# Patient Record
Sex: FEMALE | Race: WHITE | NOT HISPANIC OR LATINO | Employment: FULL TIME | URBAN - METROPOLITAN AREA
[De-identification: names, ages, dates, MRNs, and addresses within clinical notes are randomized per-mention and may not be internally consistent; named-entity substitution may affect disease eponyms.]

---

## 2018-04-25 ENCOUNTER — TRANSFERRED RECORDS (OUTPATIENT)
Dept: HEALTH INFORMATION MANAGEMENT | Facility: CLINIC | Age: 28
End: 2018-04-25

## 2018-04-25 LAB — PAP-ABSTRACT: NORMAL

## 2020-06-18 ENCOUNTER — OFFICE VISIT (OUTPATIENT)
Dept: FAMILY MEDICINE | Facility: OTHER | Age: 30
End: 2020-06-18
Attending: FAMILY MEDICINE
Payer: COMMERCIAL

## 2020-06-18 VITALS
HEART RATE: 82 BPM | DIASTOLIC BLOOD PRESSURE: 66 MMHG | TEMPERATURE: 98.1 F | OXYGEN SATURATION: 96 % | RESPIRATION RATE: 16 BRPM | SYSTOLIC BLOOD PRESSURE: 102 MMHG | WEIGHT: 152.6 LBS | HEIGHT: 66 IN | BODY MASS INDEX: 24.53 KG/M2

## 2020-06-18 DIAGNOSIS — E55.9 VITAMIN D DEFICIENCY: Primary | ICD-10-CM

## 2020-06-18 DIAGNOSIS — F41.9 ANXIETY: ICD-10-CM

## 2020-06-18 DIAGNOSIS — N83.202 LEFT OVARIAN CYST: ICD-10-CM

## 2020-06-18 LAB — DEPRECATED CALCIDIOL+CALCIFEROL SERPL-MC: 31.4 NG/ML

## 2020-06-18 PROCEDURE — 82306 VITAMIN D 25 HYDROXY: CPT | Mod: ZL | Performed by: FAMILY MEDICINE

## 2020-06-18 PROCEDURE — 36415 COLL VENOUS BLD VENIPUNCTURE: CPT | Mod: ZL | Performed by: FAMILY MEDICINE

## 2020-06-18 PROCEDURE — 99203 OFFICE O/P NEW LOW 30 MIN: CPT | Performed by: FAMILY MEDICINE

## 2020-06-18 RX ORDER — ACETAMINOPHEN 160 MG
1 TABLET,DISINTEGRATING ORAL DAILY
COMMUNITY

## 2020-06-18 RX ORDER — UBIDECARENONE 75 MG
100 CAPSULE ORAL DAILY
COMMUNITY
End: 2020-10-22

## 2020-06-18 ASSESSMENT — MIFFLIN-ST. JEOR: SCORE: 1438.94

## 2020-06-18 ASSESSMENT — PAIN SCALES - GENERAL: PAINLEVEL: NO PAIN (0)

## 2020-06-18 NOTE — PROGRESS NOTES
SUBJECTIVE:   Rowena Bowman is a 29 year old female who presents to clinic today for the following health issues:    HPI  Anxiety:  Onset of symptoms in college, and she has managed them through her lifestyle, meditation, etc.  She had been on Trazodone in the past to improve her sleep.  She reports that this helped but she does not want to go back on medicine if she can help it.  Symptoms have been worse recently.  She has been under stress from a recent move from [address], and is about to start a full-time job.  She has a history of vitamin D deficiency and is taking 2000 units daily.  She would like to have her level checked in case it may be contributing to her symptoms.    Left Ovarian Cyst:  Seen on US 10/24/18 during pregnancy.  Dimensions were 7.7 x 5.5 x 4.8 cm.  Reports that she was told there was not a concern for malignancy.  She denies pelvic pain.  Her menstrual cycles are regular, occurring every 28 days and lasting for 3-4.  She is not currently using birth control but has been on the mini pill between her pregnancies.    Past Medical History:   Diagnosis Date     History of other genital system and obstetric disorders     Premature at 37 weeks with birth weight 5 lbs 15 oz requiring oxygen first 36 hours of life.      Past Surgical History:   Procedure Laterality Date     OTHER SURGICAL HISTORY      [id number],NO PREVIOUS SURGERY,None     Family History   Problem Relation Age of Onset     Family History Negative Mother         Good Health     Other - See Comments Father         Psychiatric illness,ADHD and depression     Family History Negative Other         Good Health     Family History Negative Sister         Good Health     Family History Negative Brother         Good Health     Arthritis Paternal Grandmother         Arthritis     Heart Disease Paternal Grandfather         Heart Disease, of MI age 58     Hypertension Maternal Grandmother         Hypertension     Other - See  "Comments Maternal Grandfather         Peripheral vascular disease     Other - See Comments Maternal Grandfather          of myelodysplasia age 74 and CAD     Other - See Comments Maternal Grandfather          age 74 of coronary artery disease     Social History     Tobacco Use     Smoking status: Never Smoker     Smokeless tobacco: Never Used   Substance Use Topics     Alcohol use: Yes     Comment: Alcoholic Drinks/day: occasional     Current Outpatient Medications   Medication Sig Dispense Refill     Cholecalciferol (VITAMIN D3) 50 MCG (2000 UT) CAPS Take 1 capsule by mouth daily       cyanocobalamin (VITAMIN B-12) 100 MCG tablet Take 100 mcg by mouth daily       Allergies   Allergen Reactions     Food Itching and Swelling     Apples- Gums swell and throat itches  Hazelnuts - Itchy throat     Seasonal Allergies      Penicillins Hives and Rash     Other reaction(s): Throat Swelling/Closing     Review of Systems   Constitutional: Negative for chills and fever.   HENT: Negative for congestion, ear pain, rhinorrhea and sore throat.    Respiratory: Negative for cough and shortness of breath.    Cardiovascular: Negative for chest pain.   Gastrointestinal: Negative for abdominal pain, constipation and diarrhea.   Genitourinary: Negative for dysuria.   Skin: Negative for rash.   Neurological: Negative for headaches.   Psychiatric/Behavioral: The patient is nervous/anxious.       OBJECTIVE:     /66   Pulse 82   Temp 98.1  F (36.7  C) (Temporal)   Resp 16   Ht 1.676 m (5' 6\")   Wt 69.2 kg (152 lb 9.6 oz)   LMP 2020   SpO2 96%   BMI 24.63 kg/m    Body mass index is 24.63 kg/m .  Physical Exam  Constitutional:       General: She is not in acute distress.     Appearance: Normal appearance. She is not ill-appearing.   HENT:      Head: Normocephalic and atraumatic.   Cardiovascular:      Rate and Rhythm: Normal rate and regular rhythm.      Heart sounds: No murmur. No friction rub. No gallop.  "   Pulmonary:      Effort: Pulmonary effort is normal.      Breath sounds: Normal breath sounds. No wheezing, rhonchi or rales.   Abdominal:      General: Bowel sounds are normal.      Palpations: Abdomen is soft.      Tenderness: There is no abdominal tenderness. There is no guarding or rebound.   Musculoskeletal:         General: No deformity.   Neurological:      General: No focal deficit present.      Mental Status: She is alert.   Psychiatric:         Mood and Affect: Mood normal.       ASSESSMENT/PLAN:     1. Vitamin D deficiency  Check Vitamin D level.  Continue supplementation.  Adjust as indicated pending test result.  - Vitamin D Total    2. Anxiety  Discussed treatment including medication, exercise, counseling.  She would like to continue current management on her own but will return if symptoms continue to be uncontrolled.    3. Left ovarian cyst  Referral to OBGYN for continued monitoring and surgical removal if indicated.  - OB/GYN REFERRAL      [facility] AND \[facility] [place] [facility]\""

## 2020-06-18 NOTE — Clinical Note
Please abstract the following data from this visit with this patient into the appropriate field in Epic:    Tests that can be patient reported without a hard copy:    Pap smear done on this date: Nov-Dec 2019 (approximately), by this group: [facility], results were .     Other Tests found in the patient's chart through Chart Review/Care Everywhere:    Pap smear done by this group [facility] this date: November- December 2019    Note to Abstraction: If this section is blank, no results were found via Chart Review/Care Everywhere.

## 2020-06-21 ASSESSMENT — ENCOUNTER SYMPTOMS
DYSURIA: 0
ABDOMINAL PAIN: 0
SORE THROAT: 0
FEVER: 0
COUGH: 0
HEADACHES: 0
CHILLS: 0
NERVOUS/ANXIOUS: 1
DIARRHEA: 0
SHORTNESS OF BREATH: 0
CONSTIPATION: 0
RHINORRHEA: 0

## 2020-10-22 ENCOUNTER — OFFICE VISIT (OUTPATIENT)
Dept: FAMILY MEDICINE | Facility: OTHER | Age: 30
End: 2020-10-22
Attending: FAMILY MEDICINE
Payer: COMMERCIAL

## 2020-10-22 VITALS
DIASTOLIC BLOOD PRESSURE: 72 MMHG | OXYGEN SATURATION: 97 % | BODY MASS INDEX: 24.83 KG/M2 | HEART RATE: 67 BPM | TEMPERATURE: 97.4 F | HEIGHT: 66 IN | RESPIRATION RATE: 16 BRPM | SYSTOLIC BLOOD PRESSURE: 110 MMHG | WEIGHT: 154.5 LBS

## 2020-10-22 DIAGNOSIS — L29.9 GENERALIZED PRURITUS: Primary | ICD-10-CM

## 2020-10-22 LAB
ALBUMIN SERPL-MCNC: 4.7 G/DL (ref 3.5–5.7)
ALP SERPL-CCNC: 44 U/L (ref 34–104)
ALT SERPL W P-5'-P-CCNC: 10 U/L (ref 7–52)
ANION GAP SERPL CALCULATED.3IONS-SCNC: 7 MMOL/L (ref 3–14)
AST SERPL W P-5'-P-CCNC: 14 U/L (ref 13–39)
BASOPHILS # BLD AUTO: 0 10E9/L (ref 0–0.2)
BASOPHILS NFR BLD AUTO: 0.4 %
BILIRUB SERPL-MCNC: 0.6 MG/DL (ref 0.3–1)
BUN SERPL-MCNC: 12 MG/DL (ref 7–25)
CALCIUM SERPL-MCNC: 9.7 MG/DL (ref 8.6–10.3)
CHLORIDE SERPL-SCNC: 103 MMOL/L (ref 98–107)
CO2 SERPL-SCNC: 26 MMOL/L (ref 21–31)
CREAT SERPL-MCNC: 0.8 MG/DL (ref 0.6–1.2)
DIFFERENTIAL METHOD BLD: NORMAL
EOSINOPHIL # BLD AUTO: 0.1 10E9/L (ref 0–0.7)
EOSINOPHIL NFR BLD AUTO: 2.2 %
ERYTHROCYTE [DISTWIDTH] IN BLOOD BY AUTOMATED COUNT: 11.6 % (ref 10–15)
GFR SERPL CREATININE-BSD FRML MDRD: 85 ML/MIN/{1.73_M2}
GLUCOSE SERPL-MCNC: 92 MG/DL (ref 70–105)
HCT VFR BLD AUTO: 42.4 % (ref 35–47)
HGB BLD-MCNC: 14.3 G/DL (ref 11.7–15.7)
IMM GRANULOCYTES # BLD: 0 10E9/L (ref 0–0.4)
IMM GRANULOCYTES NFR BLD: 0.2 %
LYMPHOCYTES # BLD AUTO: 2 10E9/L (ref 0.8–5.3)
LYMPHOCYTES NFR BLD AUTO: 36.4 %
MCH RBC QN AUTO: 29.7 PG (ref 26.5–33)
MCHC RBC AUTO-ENTMCNC: 33.7 G/DL (ref 31.5–36.5)
MCV RBC AUTO: 88 FL (ref 78–100)
MONOCYTES # BLD AUTO: 0.4 10E9/L (ref 0–1.3)
MONOCYTES NFR BLD AUTO: 6.9 %
NEUTROPHILS # BLD AUTO: 2.9 10E9/L (ref 1.6–8.3)
NEUTROPHILS NFR BLD AUTO: 53.9 %
PLATELET # BLD AUTO: 310 10E9/L (ref 150–450)
POTASSIUM SERPL-SCNC: 4 MMOL/L (ref 3.5–5.1)
PROT SERPL-MCNC: 7.5 G/DL (ref 6.4–8.9)
RBC # BLD AUTO: 4.82 10E12/L (ref 3.8–5.2)
SODIUM SERPL-SCNC: 136 MMOL/L (ref 134–144)
TSH SERPL DL<=0.05 MIU/L-ACNC: 2.51 IU/ML (ref 0.34–5.6)
VIT B12 SERPL-MCNC: 311 PG/ML (ref 180–914)
WBC # BLD AUTO: 5.4 10E9/L (ref 4–11)

## 2020-10-22 PROCEDURE — 99213 OFFICE O/P EST LOW 20 MIN: CPT | Performed by: FAMILY MEDICINE

## 2020-10-22 PROCEDURE — 84443 ASSAY THYROID STIM HORMONE: CPT | Mod: ZL | Performed by: FAMILY MEDICINE

## 2020-10-22 PROCEDURE — 83789 MASS SPECTROMETRY QUAL/QUAN: CPT | Mod: ZL | Performed by: FAMILY MEDICINE

## 2020-10-22 PROCEDURE — 80053 COMPREHEN METABOLIC PANEL: CPT | Mod: ZL | Performed by: FAMILY MEDICINE

## 2020-10-22 PROCEDURE — 82607 VITAMIN B-12: CPT | Mod: ZL | Performed by: FAMILY MEDICINE

## 2020-10-22 PROCEDURE — 36415 COLL VENOUS BLD VENIPUNCTURE: CPT | Mod: ZL | Performed by: FAMILY MEDICINE

## 2020-10-22 PROCEDURE — 85025 COMPLETE CBC W/AUTO DIFF WBC: CPT | Mod: ZL | Performed by: FAMILY MEDICINE

## 2020-10-22 RX ORDER — HYDROXYZINE HYDROCHLORIDE 25 MG/1
25-50 TABLET, FILM COATED ORAL 3 TIMES DAILY PRN
Qty: 90 TABLET | Refills: 1 | Status: SHIPPED | OUTPATIENT
Start: 2020-10-22 | End: 2021-02-02

## 2020-10-22 ASSESSMENT — MIFFLIN-ST. JEOR: SCORE: 1442.56

## 2020-10-22 ASSESSMENT — ENCOUNTER SYMPTOMS
COLOR CHANGE: 0
SINUS PRESSURE: 0
NERVOUS/ANXIOUS: 0
RHINORRHEA: 0
FEVER: 0
CHILLS: 0
SORE THROAT: 0
SINUS PAIN: 0
COUGH: 0

## 2020-10-22 ASSESSMENT — PAIN SCALES - GENERAL: PAINLEVEL: NO PAIN (0)

## 2020-10-22 NOTE — NURSING NOTE
Patient presents to clinic experiencing itching all over body with out rash for past week.  Medication Reconciliation: complete    Evelia Astudillo LPN

## 2020-10-22 NOTE — PROGRESS NOTES
SUBJECTIVE:   Nursing Notes:   Evelia Astudillo LPN  10/22/2020  9:58 AM  Sign at exiting of workspace  Patient presents to clinic experiencing itching all over body with out rash for past week.  Medication Reconciliation: complete    Evelia Astudillo LPN        Rowena Bowman is a 30 year old female who presents to clinic today for a complaint of itching.  She has not had a rash.  Her entire body is affected by the itching.  The only time she doesn't notice it is when she is very cold.  Has had symptoms for about a week.  Tried cerave and lubriderm, which is not helping.  She doesn't feel that she has dry skin.  Has not changed any soaps, lotions, etc.  No new foods.  No upper respiratory infection symptoms.  Her  has not had any similar issues.  Has not had any jaundice.  No belly pain.  Took a pregnancy test and it was negative.  Her baby is almost 2.  She does take allergra D daily.    HPI    I personally reviewed medications/allergies/history listed below:    There are no active problems to display for this patient.    Past Medical History:   Diagnosis Date     History of other genital system and obstetric disorders     Premature at 37 weeks with birth weight 5 lbs 15 oz requiring oxygen first 36 hours of life.      Past Surgical History:   Procedure Laterality Date     OTHER SURGICAL HISTORY      [id number],NO PREVIOUS SURGERY,None     Family History   Problem Relation Age of Onset     Family History Negative Mother         Good Health     Other - See Comments Father         Psychiatric illness,ADHD and depression     Family History Negative Brother         Good Health     Hypertension Maternal Grandmother         Hypertension     Other - See Comments Maternal Grandfather         Peripheral vascular disease/ of myelodysplasia age 74 and CAD/ age 74 of coronary artery disease     Arthritis Paternal Grandmother         Arthritis     Heart Disease Paternal Grandfather         Heart Disease,  "of MI age 58     Family History Negative Other         Good Health     Family History Negative Sister         Good Health     Social History     Tobacco Use     Smoking status: Never Smoker     Smokeless tobacco: Never Used   Substance Use Topics     Alcohol use: Yes     Comment: Alcoholic Drinks/day: occasional     Social History     Social History Narrative    Mother Donna Garcia    Stepfather Corbin Garcia    Biological father Gurinder Bowman    siblings One younger sister, one younger brother    No smokers in the  house.  No chemical dependency.    .  One son.     Current Outpatient Medications   Medication Sig Dispense Refill     Cholecalciferol (VITAMIN D3) 50 MCG (2000 UT) CAPS Take 1 capsule by mouth daily       hydrOXYzine (ATARAX) 25 MG tablet Take 1-2 tablets (25-50 mg) by mouth 3 times daily as needed for itching 90 tablet 1     Allergies   Allergen Reactions     Food Itching and Swelling     Apples- Gums swell and throat itches  Hazelnuts - Itchy throat     Seasonal Allergies      Penicillins Hives and Rash     Other reaction(s): Throat Swelling/Closing       Review of Systems   Constitutional: Negative for chills and fever.   HENT: Negative for dental problem, ear pain, rhinorrhea, sinus pressure, sinus pain and sore throat.    Respiratory: Negative for cough.    Skin: Negative for color change and rash.   Psychiatric/Behavioral: Negative for mood changes. The patient is not nervous/anxious.         OBJECTIVE:     /72 (BP Location: Right arm, Patient Position: Sitting, Cuff Size: Adult Regular)   Pulse 67   Temp 97.4  F (36.3  C) (Tympanic)   Resp 16   Ht 1.676 m (5' 6\")   Wt 70.1 kg (154 lb 8 oz)   LMP  (LMP Unknown)   SpO2 97%   Breastfeeding No   BMI 24.94 kg/m    Body mass index is 24.94 kg/m .  Physical Exam  Constitutional:       Appearance: Normal appearance.   HENT:      Head: Normocephalic.   Eyes:      Extraocular Movements: Extraocular movements intact.      Pupils: Pupils " are equal, round, and reactive to light.   Neck:      Musculoskeletal: Normal range of motion and neck supple.   Cardiovascular:      Rate and Rhythm: Normal rate and regular rhythm.      Pulses: Normal pulses.      Heart sounds: No murmur.   Pulmonary:      Effort: Pulmonary effort is normal.      Breath sounds: No wheezing, rhonchi or rales.   Musculoskeletal:      Right lower leg: No edema.      Left lower leg: No edema.   Lymphadenopathy:      Cervical: No cervical adenopathy.   Neurological:      Mental Status: She is alert.   Psychiatric:         Mood and Affect: Mood normal.         Behavior: Behavior normal.           PHQ-2 Score:     PHQ-2 ( 1999 Pfizer) 10/22/2020 6/18/2020   Q1: Little interest or pleasure in doing things 0 0   Q2: Feeling down, depressed or hopeless 0 0   PHQ-2 Score 0 0         I personally reviewed results withpatient as listed below:   Diagnostic Test Results:  none     ASSESSMENT/PLAN:       ICD-10-CM    1. Generalized pruritus  L29.9 Comprehensive metabolic panel     CBC with platelets differential     Vitamin B12     Bile acids fractionated and total     TSH Reflex      DERMATOLOGY ADULT REFERRAL     hydrOXYzine (ATARAX) 25 MG tablet     TSH Reflex      Bile acids fractionated and total     Vitamin B12     CBC with platelets differential     Comprehensive metabolic panel       1.  Will test labs as noted above.  Recommend switching to zyrtec from allegra temporarily to see if this helps at all.  Also trial of atarax for severe itching.  Referred to Dermatology as well.    Ceci Alexander MD  [facility] AND \[facility] [place] [facility]\""    Portions of this dictation were created using the Dragon Nuance voice recognition system. Proofreading was completed but there may be errors in text.

## 2020-10-25 LAB
BILE AC SERPL-SCNC: 0.4 UMOL/L (ref 0–2.5)
CDCAE SERPL-SCNC: 0.3 UMOL/L (ref 0–3.4)
CHOLATE SERPL-SCNC: 1.3 UMOL/L (ref 0–7)
DO-CHOLATE SERPL-SCNC: 0.3 UMOL/L (ref 0–1.9)
URSODEOXYCHOLATE SERPL-SCNC: 0.3 UMOL/L (ref 0–1)

## 2021-02-02 ENCOUNTER — OFFICE VISIT (OUTPATIENT)
Dept: OBGYN | Facility: OTHER | Age: 31
End: 2021-02-02
Attending: FAMILY MEDICINE
Payer: COMMERCIAL

## 2021-02-02 VITALS
WEIGHT: 157.4 LBS | DIASTOLIC BLOOD PRESSURE: 70 MMHG | BODY MASS INDEX: 25.41 KG/M2 | HEART RATE: 86 BPM | SYSTOLIC BLOOD PRESSURE: 100 MMHG

## 2021-02-02 DIAGNOSIS — N83.209 CYST OF OVARY, UNSPECIFIED LATERALITY: Primary | ICD-10-CM

## 2021-02-02 PROCEDURE — 99205 OFFICE O/P NEW HI 60 MIN: CPT | Performed by: STUDENT IN AN ORGANIZED HEALTH CARE EDUCATION/TRAINING PROGRAM

## 2021-02-02 ASSESSMENT — PAIN SCALES - GENERAL: PAINLEVEL: NO PAIN (0)

## 2021-02-02 NOTE — PROGRESS NOTES
"Gynecology Office Visit    Chief Complaint: Surveillance of ovarian cyst    HPI:    Rowena Mcqueen is a 30 year old , here for surveillance of an ovarian cyst. She was first diagnosed with an ovarian cyst (she thinks it is on the left side) over two years ago when she was pregnant with her second son. She was told it was simple in appearance and about \"the size of a grapefruit\". She had follow up surveillance over a year ago and noted that it was unchanged since then.    In talking with her she denies any pelvic pain, pressure, nausea, vomiting, bloating, pain with intercourse or any other symptoms. She has had no changes in her appetite or weight recently. She denies hot flashes, night-sweats.     Her periods are regular. They come q28-30 days and last 5-7 days. They are painful with significant cramping. She is currently on day 4 of her menstrual cycle.      OBHx  OB History    Para Term  AB Living   3 2 2 0 1 2   SAB TAB Ectopic Multiple Live Births   1 0 0 0 0      # Outcome Date GA Lbr Red/2nd Weight Sex Delivery Anes PTL Lv   3 Term 18    M       2 Term 05/21/15    M       1 SAB      AB, MISSED      SAB in   Pelvis proven to 7lb 4 oz    GYN history:   Chlamydia in 2013 s/p treatment  Last pap smear:  NIL. No history of abnormal pap smears   Has previously been vaccinated for HPV per patient report    Menses occur q 28-30 days and usually last 5-7 days. Her periods are painful with cramping. She notes they have been regular and but before her pregnancies she was very irregular.    Past medical history:  No acute or chronic medical conditions  Specifically denies VTE, DM, HTN or bleeding disorders    Past Surgical History:  No prior surgeries    Medications:  Multivitamin  Vitamin D    Allergies:    PCN- throat swelling  Apples- itching, swelling  Seasonal allergies    Social History:  Lives at home with her , Manuel. She feels safe at home.  Denies tobacco " use  Denies drug use  Rare/social alcohol use    Family History:  Paternal grandfather: MI in his 50s  Specifically denies breast, ovarian, colon, pancreatic cancers  Specifically denies VTE, known familial thrombophilias and coagulopathies    ROS:   Respiratory: No shortness of breath, dyspnea on exertion, cough, or hemoptysis  Cardiovascular: negative for palpitations, chest pain, lower extremity edema and syncope or near-syncope  Gastrointestinal: negative for, nausea, vomiting and hematemesis  Genitourinary: negative for, dysuria, frequency and urgency  Musculoskeletal: negative for, back pain and muscular weakness  Hematologic/Lymphatic/Immunologic: negative for, anemia, chills and fever    Physical Exam  /70   Pulse 86   Wt 71.4 kg (157 lb 6.4 oz)   LMP 2021   Breastfeeding No   BMI 25.41 kg/m    Gen: Well-appearing, no acute distressed, well-groomed, alert  Cardiovascular: Regular rate. No peripheral edema, normal peripheral circulation  Pulm: Symmetric chest rise, non-labored respirations  Pelvic:  Politely declined as she is on day 4 of menstrual cycle. Will perform exam if surgical planning is needed based on US results    Assessment/Plan  Rowena Mcqueen is a 30 year old  female here for follow up of an ovarian cyst that was diagnosed approximately 2 years ago.     # Ovarian Cyst  -- TVUS ordered today-- no imaging results available for previous review  -- Politely declined exam today as she is on day 4 of her menstrual cycle  -- Discussed return precautions for torsion concerns     Counseling and MDM:  We discussed the workup of adnexal cysts in detail. We discussed that ovarian cysts or adnexal masses can be from a variety of etiologies: both benign and malignant. We talked about the warning signs on imaging that can be more concerning for cancer including cyst septations, cyst nodularity, abdominal ascites, complex appearing cysts, any other signs on pelvic or abdominal  imaging that is suspicious for malignant spread, or significant change in the cyst after close interval follow up (size or complexity). Characteristics of ovarian cysts that are more suggestive of benign diseases include simple appearing cysts, thin-walls, no sign of ascites, cyst mobility on exam and during US with gentle push from the US probe if possible.     Ovarian cysts can resolve on their own, rupture, or continue to grow in size. If the cyst is simple in appearance they can be followed with close-interval surveillance in 4-8 weeks to assess for changes. If the cyst grows, or shows any of the concerning changes, the recommendation would be to proceed with surgery for removal. We discussed that one potential risk of waiting much longer if the cyst is the same at interval follow up would be risk of oviaran torsion. If torsion occurs, an emergent surgery would be required to preserve the ovary. The risk of torsion increases in larger ovarian cysts (especially over 10 cm in size).    We talked about how if there are any concerning characteristics for malignancy on US, these characteristics can be paired with collection of lab tumor markers. These tumor markers can be helpful if they are very elevated from baseline values, and more suggestive that this mass may be a cancer, however especially in pre-menopausal women they can be slightly elevated for other reasons and not necessarily correlate with malignancy of an ovarian cyst. Utilizing the  and HE4 to calcuate a MARIA ANTONIA score can be an additional tool to differentiate malignancy risk. We discussed that each of these things can be markers to help suggest benign vs. Malignant, but they are not diagnostic and not 100% in finding malignancies. The only way to know for sure is to get a tissue sample and complete pathology examination.     With use of the US and tumor markers, we can plan for the next best steps for her care. If there is concern for malignancy, we  discussed that it would be best for her to meet with a gyn oncology team to be able to get additional intra-operative frozen pathology and further surgery (lymph nodes etc) if necessary. If the US and tumor markers are suggestive of benign disease we can plan for surgery here.       In reviewing her specific risk factors for malignancy she does not have a family history of ovarian cancer. We will review US characteristics to help further stratify.       Total amount of time spent on day of encounter with chart prep, face to face, counseling, examination and documentation was 60 minutes    LUCIANO GARCIA MD on 2/2/2021 at 10:48 AM

## 2021-02-02 NOTE — NURSING NOTE
Pt presents to clinic today for cyst on left ovary that she has an ultrasound about a year ago at [place].      Medication Reconciliation: complete  Cori Thayer LPN

## 2021-02-09 ENCOUNTER — HOSPITAL ENCOUNTER (OUTPATIENT)
Dept: ULTRASOUND IMAGING | Facility: OTHER | Age: 31
End: 2021-02-09
Attending: STUDENT IN AN ORGANIZED HEALTH CARE EDUCATION/TRAINING PROGRAM
Payer: COMMERCIAL

## 2021-02-09 ENCOUNTER — HOSPITAL ENCOUNTER (OUTPATIENT)
Facility: OTHER | Age: 31
End: 2021-02-09
Attending: STUDENT IN AN ORGANIZED HEALTH CARE EDUCATION/TRAINING PROGRAM | Admitting: STUDENT IN AN ORGANIZED HEALTH CARE EDUCATION/TRAINING PROGRAM
Payer: COMMERCIAL

## 2021-02-09 ENCOUNTER — OFFICE VISIT (OUTPATIENT)
Dept: OBGYN | Facility: OTHER | Age: 31
End: 2021-02-09
Attending: STUDENT IN AN ORGANIZED HEALTH CARE EDUCATION/TRAINING PROGRAM
Payer: COMMERCIAL

## 2021-02-09 VITALS
BODY MASS INDEX: 25.34 KG/M2 | HEART RATE: 76 BPM | SYSTOLIC BLOOD PRESSURE: 122 MMHG | WEIGHT: 157 LBS | DIASTOLIC BLOOD PRESSURE: 80 MMHG

## 2021-02-09 DIAGNOSIS — N83.201 CYST OF RIGHT OVARY: ICD-10-CM

## 2021-02-09 DIAGNOSIS — N83.201 CYST OF RIGHT OVARY: Primary | ICD-10-CM

## 2021-02-09 DIAGNOSIS — N83.209 CYST OF OVARY, UNSPECIFIED LATERALITY: ICD-10-CM

## 2021-02-09 PROCEDURE — 76856 US EXAM PELVIC COMPLETE: CPT

## 2021-02-09 PROCEDURE — 99214 OFFICE O/P EST MOD 30 MIN: CPT | Performed by: STUDENT IN AN ORGANIZED HEALTH CARE EDUCATION/TRAINING PROGRAM

## 2021-02-09 ASSESSMENT — PAIN SCALES - GENERAL: PAINLEVEL: NO PAIN (0)

## 2021-02-09 NOTE — NURSING NOTE
Pt presents to clinic today for follow up ultrasound.      Medication Reconciliation: complete  Cori Thayer LPN

## 2021-02-09 NOTE — PROGRESS NOTES
"Follow-Up Visit    S: Ms. Rowena Mcqueen is a 30 year old  here for follow up of an ovarian cyst. She was first diagnosed with an ovarian cyst (she thinks it is on the left side) over two years ago when she was pregnant with her second son. She was told it was simple in appearance and about \"the size of a grapefruit\". She had follow up surveillance over a year ago and noted that it was unchanged since then. She had an US today which shows a persistent right ovarian cyst. It is measuring approximately 4 x 7 x 3 cm.     She continues to deny any symptoms with this cyst. It was incidentally found and she has not been bothered for two years.     O:  /80   Pulse 76   Wt 71.2 kg (157 lb)   LMP 2021   BMI 25.34 kg/m      Gen: Well-appearing, NAD  Pulm: Non-labored, symmetrical chest rise, lungs clear to auscultation  Cardiac: regular rate and rhythm, no murmurs or gallop. Normal peripheral perfusion.  Abd: Soft, non-tender, non-distended  Ext: No LE edema, extremities warm and well perfused  Pelvic:  Normal appearing external female genitalia. Normal hair distribution. Vagina is without lesions. No vaginal discharge. Cervix parous, no lesions, no cervical motion tenderness. Uterus is small, mobile, non-tender. Tenderness in suprapubic region and on her right side with bimanual exam.     Assessment/Plan  Rowena Mcqueen is a 30 year old  female here for follow up of an ovarian cyst that was diagnosed approximately 2 years ago.     # Ovarian Cyst  -- TVUS today shows: persistent cyst measuring 4 x 7 x 3 cm. Abdominal US only, shows mostly simple in appearance, but detail is not as clear as TV probe declined. She notes she will get TV probe to help clinically assess any complex features that would warrant lab draw/tumor markers prior to surgery. We dicussed the importance of ensuring this is the safest place for surgery if there is any concern for cancer- at this time there isn't, but " will ensure based on transvaginal US.  -- Surgical planning: consents signed for diagnostic laparoscopy, ovarian cystectomy, possible bilateral salpingectomy (she will discuss this with her - leaning towards doing it) and possible unilateral oophorectomy if clinically indicated.  -- Discussed return precautions for torsion concerns      Counseling and MDM:  Based on today's US images, the cyst is 7cm in size. Like our last visit we again addressed options moving forward including surveillance and repeat in 4-8 weeks vs. Surgical management. As this has been known about for many years, we discussed that it is likely not going to resolve on its own. She would like to schedule surgery for removal.     We talked about how if there are any concerning characteristics for malignancy on US (will get a transvaginal image), these characteristics can be paired with collection of lab tumor markers. If US shows simple cyst, we will not need tumor markers to be drawn before the surgery.  She was in agreement with this plan and all questions were answered. We discussed the surgery in detail including the potential risks including but not limited to injury to the bladder or bowel, bleeding, potential need to remove the entire ovary if bleeding is unable to be controlled with surgical measures.     We also discussed the opportunity for a bilateral salpingectomy at the time of the procedure if she is done child bearing and to reduce any future risk of ovarian cancer. She and her  are done childbearing and were talking about taking steps to a vasectomy. She is interested in getting the tubes out at the same time and will discuss this with her . She confirmed her understanding that this would be a permanent choice and that if she wanted any future children she would need to go through assisted-reproductive techniques with IVF and embryo implantation. All questions were answered and consents were signed.    Total  time spent during today's encounter including chart review, review of images, face to face, exam, counseling and documentation was 35 minutes    LUCIANO GARCIA MD on 2/9/2021 at 7:19 PM

## 2021-02-09 NOTE — PROGRESS NOTES
"Date of Surgery: 3/29/2021  Type of Surgery: Laparoscopic Salpingectomy, ovarian cystectomy possible unilateral oophorectomy if indicated  Surgeon: Marcelina    Patient's consents were signed and appropriate appointments were scheduled by the [facility] . Patient was given surgical folder which includes pre-operative bathing instructions related to the two packets of Hibiclens surgical prep provided. Surgical forms were copied and kept for informative purposes. Originals were delivered to Day-surgery. Patient denies questions at this time.        STOP BANG    Fever/Chills or other infectious symptoms in past month? n  >10 pound weight loss in the past 2 months? n  Health Care Directive on file? n  History of blood transfusions? n  Td up to date? y  History of VRE/MRSA? n      Obstructive Sleep Apnea screening    Preoperative Evaluation: Obstructive Sleep Apnea screening    S: Snore -  Do you snore loudly? (louder than talking or loud enough to be heard through closed doors)n  T: Tired - Do you often feel tired, fatigued, or sleepy during the daytime?n  O: Observed - Has anyone ever observed you stop breathing during your sleep?n  P: Pressure - Do you have or are you being treated for high blood pressure?n  B: BMI - BMI greater than 35kg/m2?n  A: Age - Age over 50 years old?n  N: Neck - Neck circumference greater than 40 cm?n  G: Gender - Gender: Male?n    Total number of \"YES\" responses:  0    Scoring: Low risk of GAMALIEL 0-2  At Risk of GAMALIEL: >3 High Risk of GAMALIEL: 5-8      Total yes answers in GAMALIEL section:    Low risk 0-2  At risk 3-4  High risk 5-8    Noemi Anderson RN............. 2/9/2021 2:12 PM   "

## 2021-03-06 ENCOUNTER — HEALTH MAINTENANCE LETTER (OUTPATIENT)
Age: 31
End: 2021-03-06

## 2021-03-21 ENCOUNTER — ALLIED HEALTH/NURSE VISIT (OUTPATIENT)
Dept: FAMILY MEDICINE | Facility: OTHER | Age: 31
End: 2021-03-21
Attending: FAMILY MEDICINE
Payer: COMMERCIAL

## 2021-03-21 DIAGNOSIS — Z20.822 EXPOSURE TO COVID-19 VIRUS: Primary | ICD-10-CM

## 2021-03-21 PROCEDURE — 87635 SARS-COV-2 COVID-19 AMP PRB: CPT | Mod: ZL | Performed by: FAMILY MEDICINE

## 2021-03-21 PROCEDURE — C9803 HOPD COVID-19 SPEC COLLECT: HCPCS

## 2021-03-22 LAB
LABORATORY COMMENT REPORT: ABNORMAL
SARS-COV-2 RNA RESP QL NAA+PROBE: NORMAL
SARS-COV-2 RNA RESP QL NAA+PROBE: POSITIVE
SPECIMEN SOURCE: ABNORMAL
SPECIMEN SOURCE: NORMAL

## 2021-03-23 ENCOUNTER — TELEPHONE (OUTPATIENT)
Dept: OBGYN | Facility: OTHER | Age: 31
End: 2021-03-23

## 2021-03-23 ENCOUNTER — TELEPHONE (OUTPATIENT)
Dept: FAMILY MEDICINE | Facility: OTHER | Age: 31
End: 2021-03-23

## 2021-03-23 NOTE — TELEPHONE ENCOUNTER
Patient tested positive for COVID-19 on 3/21/21. She is symptomatic and symptoms started 3/21/21. First day patient is able to reschedule surgery is 4/5/21. She needs to coordinate with family and will call to reschedule. She is thinking it may be June or July when she reschedules.    Joelle Brambila RN...................3/23/2021 11:52 AM

## 2021-03-23 NOTE — TELEPHONE ENCOUNTER
"Coronavirus (COVID-19) Notification    Caller Name (Patient, parent, daughter/son, grandparent, etc)  patient    Reason for call  Notify of Positive Coronavirus (COVID-19) lab results, assess symptoms,  review  [facility] recommendations    Lab Result    Lab test:  2019-nCoV rRt-PCR or SARS-CoV-2 PCR    Oropharyngeal AND/OR nasopharyngeal swabs is POSITIVE for 2019-nCoV RNA/SARS-COV-2 PCR (COVID-19 virus)    RN Recommendations/Instructions per [facility] Coronavirus COVID-19 recommendations    Brief introduction script  Introduce self then review script:  \"I am calling on behalf of [employer].  We were notified that your Coronavirus test (COVID-19) for was POSITIVE for the virus.  I have some information to relay to you but first I wanted to mention that the [place] Dept of Health will be contacting you shortly [it's possible MD already called Patient] to talk to you more about how you are feeling and other people you have had contact with who might now also have the virus.  Also,  [employer] [facility] is Partnering with the [facility] for Covid-19 research, you may be contacted directly by research staff.\"    Assessment (Inquire about Patient's current symptoms)   Assessment   Current Symptoms at time of phone call: (if no symptoms, document No symptoms] Nasal congestion, cough, fatigue   Symptoms onset (if applicable) 3/21/2021     If at time of call, Patients symptoms hare worsened, the Patient should contact 911 or have someone drive them to Emergency Dept promptly:      If Patient calling 911, inform 911 personal that you have tested positive for the Coronavirus (COVID-19).  Place mask on and await 911 to arrive.    If Emergency Dept, If possible, please have another adult drive you to the Emergency Dept but you need to wear mask when in contact with other people.      Monoclonal Antibody Administration    You may be eligible to receive a new treatment with a monoclonal antibody for preventing " "hospitalization in patients at high risk for complications from COVID-19.   This medication is still experimental and available on a limited basis; it is given through an IV and must be given at an infusion center. Please note that not all people who are eligible will receive the medication since it is in limited supply.     Are you interested in being considered for this medication?  No.   Does the patient fit the criteria: Patient declined    If patient qualifies based on above criteria:  \"We will contact you if you are selected to receive the medication in the next 1-2 days.   This is time sensitive and if you are not selected in the next 1-2 days, you will not receive the medication.  If you do not receive a call to schedule, you have not been selected.\"    Review information with Patient    Your result was positive. This means you have COVID-19 (coronavirus).  We have sent you a letter that reviews the information that I'll be reviewing with you now.    How can I protect others?    If you have symptoms: stay home and away from others (self-isolate) until:    You've had no fever--and no medicine that reduces fever--for 1 full day (24 hours). And       Your other symptoms have gotten better. For example, your cough or breathing has improved. And     At least 10 days have passed since your symptoms started. (If you've been told by a doctor that you have a weak immune system, wait 20 days.)     If you don't have symptoms: Stay home and away from others (self-isolate) until at least 10 days have passed since your first positive COVID-19 test. (Date test collected)    During this time:    Stay in your own room, including for meals. Use your own bathroom if you can.    Stay away from others in your home. No hugging, kissing or shaking hands. No visitors.     Don't go to work, school or anywhere else.     Clean  high touch  surfaces often (doorknobs, counters, handles, etc.). Use a household cleaning spray or wipes. " You'll find a full list on the EPA website at www.epa.gov/pesticide-registration/list-n-disinfectants-use-against-sars-cov-2.     Cover your mouth and nose with a mask, tissue or other face covering to avoid spreading germs.    Wash your hands and face often with soap and water.    Make a list of people you have been in close contact with recently, even if either of you wore a face covering.   ; Start your list from 2 days before you became ill or had a positive test.  ; Include anyone that was within 6 feet of you for a cumulative total of 15 minutes or more in 24 hours. (Example: if you sat next to Corbin for 5 minutes in the morning and 10 minutes in the afternoon, then you were in close contact for 15 minutes total that day. Corbin would be added to your list.)    A public health worker will call or text you. It is important that you answer. They will ask you questions about possible exposures to COVID-19, such as people you have been in direct contact with and places you have visited.    Tell the people on your list that you have COVID-19; they should stay away from others for 14 days starting from the last time they were in contact with you (unless you are told something different from a public health worker).     Caregivers in these groups are at risk for severe illness due to COVID-19:  o People 65 years and older  o People who live in a nursing home or long-term care facility  o People with chronic disease (lung, heart, cancer, diabetes, kidney, liver, immunologic)  o People who have a weakened immune system, including those who:  - Are in cancer treatment  - Take medicine that weakens the immune system, such as corticosteroids  - Had a bone marrow or organ transplant  - Have an immune deficiency  - Have poorly controlled HIV or AIDS  - Are obese (body mass index of 40 or higher)  - Smoke regularly    Caregivers should wear gloves while washing dishes, handling laundry and cleaning bedrooms and  bathrooms.    Wash and dry laundry with special caution. Don't shake dirty laundry, and use the warmest water setting you can.    If you have a weakened immune system, ask your doctor about other actions you should take.    For more tips, go to www.cdc.gov/coronavirus/2019-ncov/downloads/10Things.pdf.    You should not go back to work until you meet the guidelines above for ending your home isolation. You don't need to be retested for COVID-19 before going back to work--studies show that you won't spread the virus if it's been at least 10 days since your symptoms started (or 20 days, if you have a weak immune system).    Employers: This document serves as formal notice of your employee's medical guidelines for going back to work. They must meet the above guidelines before going back to work in person.    How can I take care of myself?    1. Get lots of rest. Drink extra fluids (unless a doctor has told you not to).    2. Take Tylenol (acetaminophen) for fever or pain. If you have liver or kidney problems, ask your family doctor if it's okay to take Tylenol.     Take either:     650 mg (two 325 mg pills) every 4 to 6 hours, or     1,000 mg (two 500 mg pills) every 8 hours as needed.     Note: Don't take more than 3,000 mg in one day. Acetaminophen is found in many medicines (both prescribed and over-the-counter medicines). Read all labels to be sure you don't take too much.    For children, check the Tylenol bottle for the right dose (based on their age or weight).    3. If you have other health problems (like cancer, heart failure, an organ transplant or severe kidney disease): Call your specialty clinic if you don't feel better in the next 2 days.    4. Know when to call 911: Emergency warning signs include:    Trouble breathing or shortness of breath    Pain or pressure in the chest that doesn't go away    Feeling confused like you haven't felt before, or not being able to wake up    Bluish-colored lips or  face    5. Sign up for [employer]. We know it's scary to hear that you have COVID-19. We want to track your symptoms to make sure you're okay over the next 2 weeks. Please look for an email from [employer]--this is a free, online program that we'll use to keep in touch. To sign up, follow the link in the email. Learn more at [email or web address].    Where can I get more information?    [facility]: [email or web address]    Coronavirus Basics: [email or web address]./[email or web address]/coronavirus/basics.html    What to Do If You're Sick: www.cdc.gov/coronavirus/2019-ncov/about/steps-when-sick.html    Ending Home Isolation: www.cdc.gov/coronavirus/2019-ncov/hcp/disposition-in-home-patients.html     Caring for Someone with COVID-19: www.cdc.gov/coronavirus/2019-ncov/if-you-are-sick/care-for-someone.html     [facility] clinical trials (COVID-19 research studies): clinicalaffairs.[email or web address]-clinical-trials     A Positive COVID-19 letter will be sent via [employer] or the mail. (Exception, no letters sent to Presurgerical/Preprocedure Patients)    Stephanie Brenner LPN

## 2021-04-06 ENCOUNTER — HOSPITAL ENCOUNTER (OUTPATIENT)
Facility: OTHER | Age: 31
End: 2021-04-06
Attending: STUDENT IN AN ORGANIZED HEALTH CARE EDUCATION/TRAINING PROGRAM | Admitting: STUDENT IN AN ORGANIZED HEALTH CARE EDUCATION/TRAINING PROGRAM
Payer: COMMERCIAL

## 2021-04-06 ENCOUNTER — TELEPHONE (OUTPATIENT)
Dept: OBGYN | Facility: OTHER | Age: 31
End: 2021-04-06

## 2021-04-06 DIAGNOSIS — N94.9 ADNEXAL CYST: Primary | ICD-10-CM

## 2021-04-06 DIAGNOSIS — N94.9 ADNEXAL CYST: ICD-10-CM

## 2021-04-06 NOTE — TELEPHONE ENCOUNTER
Returned patients call and rescheduled surgery for 6/7/21. Will have  contact her to get other appointments made. No further questions or concerns at this time. Dr. Hewitt to put case request in.   Noemi Anderson RN on 4/6/2021 at 3:11 PM

## 2021-04-06 NOTE — TELEPHONE ENCOUNTER
Patient called and would like to reschedule her surgery.      Mackenzie Ortega on 4/6/2021 at 8:54 AM

## 2021-05-21 ENCOUNTER — OFFICE VISIT (OUTPATIENT)
Dept: FAMILY MEDICINE | Facility: OTHER | Age: 31
End: 2021-05-21
Attending: STUDENT IN AN ORGANIZED HEALTH CARE EDUCATION/TRAINING PROGRAM
Payer: COMMERCIAL

## 2021-05-21 ENCOUNTER — HOSPITAL ENCOUNTER (OUTPATIENT)
Dept: ULTRASOUND IMAGING | Facility: OTHER | Age: 31
End: 2021-05-21
Attending: STUDENT IN AN ORGANIZED HEALTH CARE EDUCATION/TRAINING PROGRAM
Payer: COMMERCIAL

## 2021-05-21 VITALS
SYSTOLIC BLOOD PRESSURE: 100 MMHG | WEIGHT: 155 LBS | TEMPERATURE: 98.1 F | DIASTOLIC BLOOD PRESSURE: 72 MMHG | RESPIRATION RATE: 16 BRPM | HEART RATE: 69 BPM | HEIGHT: 66 IN | OXYGEN SATURATION: 99 % | BODY MASS INDEX: 24.91 KG/M2

## 2021-05-21 DIAGNOSIS — N83.201 CYST OF RIGHT OVARY: ICD-10-CM

## 2021-05-21 DIAGNOSIS — J30.9 ALLERGIC RHINITIS, UNSPECIFIED SEASONALITY, UNSPECIFIED TRIGGER: ICD-10-CM

## 2021-05-21 DIAGNOSIS — Z01.818 PREOP GENERAL PHYSICAL EXAM: Primary | ICD-10-CM

## 2021-05-21 DIAGNOSIS — N94.9 ADNEXAL CYST: ICD-10-CM

## 2021-05-21 PROBLEM — L25.9 CONTACT DERMATITIS AND ECZEMA: Status: ACTIVE | Noted: 2021-05-21

## 2021-05-21 PROBLEM — L25.9 CONTACT DERMATITIS AND ECZEMA: Status: RESOLVED | Noted: 2021-05-21 | Resolved: 2021-05-21

## 2021-05-21 LAB
HCG UR QL: NEGATIVE
HGB BLD-MCNC: 13.9 G/DL (ref 11.7–15.7)

## 2021-05-21 PROCEDURE — 76830 TRANSVAGINAL US NON-OB: CPT

## 2021-05-21 PROCEDURE — 85018 HEMOGLOBIN: CPT | Mod: ZL | Performed by: PHYSICIAN ASSISTANT

## 2021-05-21 PROCEDURE — 99214 OFFICE O/P EST MOD 30 MIN: CPT | Performed by: PHYSICIAN ASSISTANT

## 2021-05-21 PROCEDURE — 81025 URINE PREGNANCY TEST: CPT | Mod: ZL | Performed by: PHYSICIAN ASSISTANT

## 2021-05-21 PROCEDURE — 36415 COLL VENOUS BLD VENIPUNCTURE: CPT | Mod: ZL | Performed by: PHYSICIAN ASSISTANT

## 2021-05-21 ASSESSMENT — MIFFLIN-ST. JEOR: SCORE: 1444.83

## 2021-05-21 NOTE — NURSING NOTE
Chief Complaint   Patient presents with     Pre-Op Exam         Medication Reconciliation: complete    Layne Ugalde, LPN

## 2021-05-21 NOTE — PATIENT INSTRUCTIONS
Patient should take the following medications the morning of surgery with a small sip of water: hold all meds.  Patient was instructed to hold the following medications the morning of surgery: hold all meds.     Patient was advised to call our office and the surgical services with any change in condition or new symptoms if they were to develop between today and their surgical date.  Especially any cardiopulmonary symptoms or symptoms concerning for an infection.     Discontinue aspirin, aleve, naproxen and ibuprofen 7 days prior to surgery to reduce bleeding risk.  It is fine to take tylenol the week before surgery.  Hold vitamins and herbal remedies for 7 days before surgery.    Preparing for Your Surgery  Getting started  A nurse will call you to review your health history and instructions. They will give you an arrival time based on your scheduled surgery time.  Please be ready to share the following:    Your doctor's clinic name and phone number    Your medical, surgical and anesthesia history    A list of allergies and sensitivities    A list of medicines, including herbal treatments and over-the-counter drugs    Whether the patient has a legal guardian (ask how to send us the papers in advance)  If you have a child who's having surgery, please ask for a copy of Preparing for Your Child's Surgery.    Preparing for surgery    Within 30 days of surgery: Have a pre-op exam (sometimes called an H&P, or History and Physical). This can be done at a clinic or pre-operative center.  ? If you're having a , you may not need this exam. Talk to your care team    At your pre-op exam, talk to your care team about all medicines you take. If you need to stop any medicines before surgery, ask when to start taking them again.  ? We do this for your safety. Many medicines can make you bleed too much during surgery. Some change how well surgery (anesthesia) drugs work.    Call your insurance company to let them know  you're having surgery. (If you don't have insurance, call [phone].)    Call your clinic if there's any change in your health. This includes signs of a cold or flu (sore throat, runny nose, cough, rash, fever). It also includes a scrape or scratch near the surgery site.    If you have questions on the day of surgery, call your hospital or surgery center.  Eating and drinking guidelines  For your safety: Unless your surgeon tells you otherwise, follow the guidelines below.    Eat and drink as usual until 8 hours before surgery. After that, no food or milk.    Drink clear liquids until 2 hours before surgery. These are liquids you can see through, like water, Gatorade and Propel Water. You may also have black coffee and tea (no cream or milk).    Nothing by mouth within 2 hours of surgery. This includes gum, candy and breath mints.    If you drink, stop drinking alcohol the night before surgery.    If your care team tells you to take medicine on the morning of surgery, it's okay to take it with a sip of water.  Preventing infection    Shower or bathe the night before and morning of your surgery. Follow the instructions your clinic gave you. (If no instructions, use regular soap.)    Don't shave or clip hair near your surgery site. We'll remove the hair if needed.    Don't smoke or vape the morning of surgery. You may chew nicotine gum up to 2 hours before surgery. A nicotine patch is okay.  ? Note: Some surgeries require you to completely quit smoking and nicotine. Check with your surgeon.    Your care team will make every effort to keep you safe from infection. We will:  ? Clean our hands often with soap and water (or an alcohol-based hand rub).  ? Clean the skin at your surgery site with a special soap that kills germs.  ? Give you a special gown to keep you warm. (Cold raises the risk of infection.)  ? Wear special hair covers, masks, gowns and gloves during surgery.  ? Give antibiotic medicine, if prescribed.  Not all surgeries need antibiotics.  What to bring on the day of surgery    Photo ID and insurance card    Copy of your health care directive, if you have one    Glasses and hearing aides (bring cases)  ? You can't wear contacts during surgery    Inhaler and eye drops, if you use them (tell us about these when you arrive)    CPAP machine or breathing device, if you use them    A few personal items, if spending the night    If you have . . .  ? A pacemaker or ICD (cardiac defibrillator): Bring the ID card.  ? An implanted stimulator: Bring the remote control.  ? A legal guardian: Bring a copy of the certified (court-stamped) guardianship papers.  Please remove any jewelry, including body piercings. Leave jewelry and other valuables at home.  If you're going home the day of surgery  Important: If you don't follow the rules below, we must cancel your surgery.     Arrange for someone to drive you home after surgery. You may not drive, take a taxi or take public transportation by yourself (unless you'll have local anesthesia only).    Arrange for a responsible adult to stay with you overnight. If you don't, we may keep you in the hospital overnight, and you may need to pay the costs yourself.  Questions?   If you have any questions for your care team, list them here: _________________________________________________________________________________________________________________________________________________________________________________________________________________________________________________________________________________________________________________________  For informational purposes only. Not to replace the advice of your health care provider. Copyright   2003, 2019 [facility]. All rights reserved. Clinically reviewed by Della Jimenez MD. [employer] 516479 - REV 4/20.

## 2021-05-21 NOTE — PROGRESS NOTES
[facility] AND [facility]  [address]  [place] [address]  Phone: [phone]  Fax: [phone]  Primary Provider: Ceci Alexander  Pre-op Performing Provider: ROB CHAVEZ      PREOPERATIVE EVALUATION:  Today's date: 5/21/2021    Rowena Mcqueen is a 30 year old female who presents for a preoperative evaluation.    Surgical Information:  Surgery/Procedure: EXCISION, CYST, BENIGN, OVARY, LAPAROSCOPIC, exam under anesthesia, diagnostic laparoscopy, possible bilateral salpingectomy, possible unilateral oophorectomy  Surgery Location: [facility]  Surgeon: Dr. Elsa Hewitt  Surgery Date: 6/7/21  Time of Surgery:   Where patient plans to recover: At home with family  Fax number for surgical facility: Note does not need to be faxed, will be available electronically in Epic.    Type of Anesthesia Anticipated: General    Assessment & Plan     The proposed surgical procedure is considered INTERMEDIATE risk.    Problem List Items Addressed This Visit        Respiratory    Allergic rhinitis       Endocrine    Cyst of right ovary    Relevant Orders    Hemoglobin (Completed)    Pregnancy, Urine (HCG) (Completed)       Urinary    Adnexal cyst    Relevant Orders    Hemoglobin (Completed)    Pregnancy, Urine (HCG) (Completed)      Other Visit Diagnoses     Preop general physical exam    -  Primary    Relevant Orders    Hemoglobin (Completed)    Pregnancy, Urine (HCG) (Completed)          Complete lab work including hemoglobin and EGD.  Labs are normal.  No acute concerns at this time.  Patient recently had Covid-19 that was diagnosed on 3/19/2021.  Therefore she does not need Covid-19 test prior to surgery since surgery is within 3 months of the infection.    No acute concerns prior to surgery.    Risks and Recommendations:  The patient has the following additional risks and recommendations for perioperative complications:   - No identified additional risk factors other than previously  addressed    Medication Instructions:  Patient Instructions     Patient should take the following medications the morning of surgery with a small sip of water: hold all meds.  Patient was instructed to hold the following medications the morning of surgery: hold all meds.     Patient was advised to call our office and the surgical services with any change in condition or new symptoms if they were to develop between today and their surgical date.  Especially any cardiopulmonary symptoms or symptoms concerning for an infection.     Discontinue aspirin, aleve, naproxen and ibuprofen 7 days prior to surgery to reduce bleeding risk.  It is fine to take tylenol the week before surgery.  Hold vitamins and herbal remedies for 7 days before surgery.    Preparing for Your Surgery  Getting started  A nurse will call you to review your health history and instructions. They will give you an arrival time based on your scheduled surgery time.  Please be ready to share the following:    Your doctor's clinic name and phone number    Your medical, surgical and anesthesia history    A list of allergies and sensitivities    A list of medicines, including herbal treatments and over-the-counter drugs    Whether the patient has a legal guardian (ask how to send us the papers in advance)  If you have a child who's having surgery, please ask for a copy of Preparing for Your Child's Surgery.    Preparing for surgery    Within 30 days of surgery: Have a pre-op exam (sometimes called an H&P, or History and Physical). This can be done at a clinic or pre-operative center.  ? If you're having a , you may not need this exam. Talk to your care team    At your pre-op exam, talk to your care team about all medicines you take. If you need to stop any medicines before surgery, ask when to start taking them again.  ? We do this for your safety. Many medicines can make you bleed too much during surgery. Some change how well surgery (anesthesia)  drugs work.    Call your insurance company to let them know you're having surgery. (If you don't have insurance, call [phone].)    Call your clinic if there's any change in your health. This includes signs of a cold or flu (sore throat, runny nose, cough, rash, fever). It also includes a scrape or scratch near the surgery site.    If you have questions on the day of surgery, call your hospital or surgery center.  Eating and drinking guidelines  For your safety: Unless your surgeon tells you otherwise, follow the guidelines below.    Eat and drink as usual until 8 hours before surgery. After that, no food or milk.    Drink clear liquids until 2 hours before surgery. These are liquids you can see through, like water, Gatorade and Propel Water. You may also have black coffee and tea (no cream or milk).    Nothing by mouth within 2 hours of surgery. This includes gum, candy and breath mints.    If you drink, stop drinking alcohol the night before surgery.    If your care team tells you to take medicine on the morning of surgery, it's okay to take it with a sip of water.  Preventing infection    Shower or bathe the night before and morning of your surgery. Follow the instructions your clinic gave you. (If no instructions, use regular soap.)    Don't shave or clip hair near your surgery site. We'll remove the hair if needed.    Don't smoke or vape the morning of surgery. You may chew nicotine gum up to 2 hours before surgery. A nicotine patch is okay.  ? Note: Some surgeries require you to completely quit smoking and nicotine. Check with your surgeon.    Your care team will make every effort to keep you safe from infection. We will:  ? Clean our hands often with soap and water (or an alcohol-based hand rub).  ? Clean the skin at your surgery site with a special soap that kills germs.  ? Give you a special gown to keep you warm. (Cold raises the risk of infection.)  ? Wear special hair covers, masks, gowns and gloves  during surgery.  ? Give antibiotic medicine, if prescribed. Not all surgeries need antibiotics.  What to bring on the day of surgery    Photo ID and insurance card    Copy of your health care directive, if you have one    Glasses and hearing aides (bring cases)  ? You can't wear contacts during surgery    Inhaler and eye drops, if you use them (tell us about these when you arrive)    CPAP machine or breathing device, if you use them    A few personal items, if spending the night    If you have . . .  ? A pacemaker or ICD (cardiac defibrillator): Bring the ID card.  ? An implanted stimulator: Bring the remote control.  ? A legal guardian: Bring a copy of the certified (court-stamped) guardianship papers.  Please remove any jewelry, including body piercings. Leave jewelry and other valuables at home.  If you're going home the day of surgery  Important: If you don't follow the rules below, we must cancel your surgery.     Arrange for someone to drive you home after surgery. You may not drive, take a taxi or take public transportation by yourself (unless you'll have local anesthesia only).    Arrange for a responsible adult to stay with you overnight. If you don't, we may keep you in the hospital overnight, and you may need to pay the costs yourself.  Questions?   If you have any questions for your care team, list them here: _________________________________________________________________________________________________________________________________________________________________________________________________________________________________________________________________________________________________________________________  For informational purposes only. Not to replace the advice of your health care provider. Copyright   2003, 2019 [facility]. All rights reserved. Clinically reviewed by Della Jimenez MD. [employer]ART[employer] 831735 - REV 4/20.       RECOMMENDATION:  APPROVAL GIVEN to proceed with proposed  procedure, without further diagnostic evaluation.    30 minutes spent on the date of the encounter doing chart review, history and exam, documentation and further activities per the note        Subjective     HPI related to upcoming procedure: Patient is history of a right ovarian cyst.  Has pain with pushing on the region.  Scheduled for surgical removal.  It has been there for approximately 2 years since the pregnancy of her second son.    Preop Questions 5/21/2021   1. Have you ever had a heart attack or stroke? No   2. Have you ever had surgery on your heart or blood vessels, such as a stent placement, a coronary artery bypass, or surgery on an artery in your head, neck, heart, or legs? No   3. Do you have chest pain with activity? No   4. Do you have a history of  heart failure? No   5. Do you currently have a cold, bronchitis or symptoms of other infection? No   6. Do you have a cough, shortness of breath, or wheezing? No   7. Do you or anyone in your family have previous history of blood clots? No   8. Do you or does anyone in your family have a serious bleeding problem such as prolonged bleeding following surgeries or cuts? No   9. Have you ever had problems with anemia or been told to take iron pills? YES - anemia with pregnancy   10. Have you had any abnormal blood loss such as black, tarry or bloody stools, or abnormal vaginal bleeding? No   11. Have you ever had a blood transfusion? No   12. Are you willing to have a blood transfusion if it is medically needed before, during, or after your surgery? Yes   13. Have you or any of your relatives ever had problems with anesthesia? YES - Grandmother gets sick with anesthesia   14. Do you have sleep apnea, excessive snoring or daytime drowsiness? No   15. Do you have any artifical heart valves or other implanted medical devices like a pacemaker, defibrillator, or continuous glucose monitor? No   16. Do you have artificial joints? No   17. Are you allergic to  latex? No   18. Is there any chance that you may be pregnant? No       Health Care Directive:  Patient does not have a Health Care Directive or Living Will: Discussed advance care planning with patient; information given to patient to review.    Preoperative Review of :   reviewed - no record of controlled substances prescribed.      Status of Chronic Conditions:  Patient has history of seasonal allergies.  No acute concerns at this time.  Symptoms are stable.    Patient has tolerated surgery well in the past.  Patient has no recent cough or cold symptoms.  No recent fevers, chills, sore throat, ear pain, chest pain, palpitations, problems breathing, stomachaches, nausea, vomiting, diarrhea, constipation, blood in stool or urine, dysuria, frequency, urgency.    Patient can walk up a flight of stairs without becoming short of breath or having chest pain: YES   Patient is able to tolerate greater than 4 METs of activity without any cardiopulmonary symptoms.        Review of Systems  CONSTITUTIONAL: NEGATIVE for fever, chills, change in weight  INTEGUMENTARY/SKIN: NEGATIVE for worrisome rashes, moles or lesions  EYES: NEGATIVE for vision changes or irritation  ENT/MOUTH: NEGATIVE for ear, mouth and throat problems  RESP: NEGATIVE for significant cough or SOB  BREAST: NEGATIVE for masses, tenderness or discharge  CV: NEGATIVE for chest pain, palpitations or peripheral edema  GI: NEGATIVE for nausea, abdominal pain, heartburn, or change in bowel habits  : NEGATIVE for frequency, dysuria, or hematuria  MUSCULOSKELETAL: NEGATIVE for significant arthralgias or myalgia  NEURO: NEGATIVE for weakness, dizziness or paresthesias  ENDOCRINE: NEGATIVE for temperature intolerance, skin/hair changes  HEME: NEGATIVE for bleeding problems  PSYCHIATRIC: NEGATIVE for changes in mood or affect    Patient Active Problem List    Diagnosis Date Noted     Allergic rhinitis 05/21/2021     Priority: Medium     Adnexal cyst 04/06/2021      Priority: Medium     Added automatically from request for surgery [id number]       Cyst of right ovary 2021     Priority: Medium     Added automatically from request for surgery [id number]        Past Medical History:   Diagnosis Date     Contact dermatitis and eczema 2021    Formatting of this note might be different from the original. Scalp dermatitis, dermatological consult pending.     History of other genital system and obstetric disorders     Premature at 37 weeks with birth weight 5 lbs 15 oz requiring oxygen first 36 hours of life.     Past Surgical History:   Procedure Laterality Date     WISDOM TOOTH EXTRACTION       Current Outpatient Medications   Medication Sig Dispense Refill     Cholecalciferol (VITAMIN D3) 50 MCG (2000) CAPS Take 1 capsule by mouth daily         Allergies   Allergen Reactions     Food Itching and Swelling     Apples- Gums swell and throat itches  Hazelnuts - Itchy throat     Seasonal Allergies      Penicillins Hives and Rash     Other reaction(s): Throat Swelling/Closing        Social History     Tobacco Use     Smoking status: Never Smoker     Smokeless tobacco: Never Used   Substance Use Topics     Alcohol use: Yes     Comment: Alcoholic Drinks/day: occasional     Family History   Problem Relation Age of Onset     Family History Negative Mother         Good Health     Other - See Comments Father         Psychiatric illness,ADHD and depression     Family History Negative Brother         Good Health     Hypertension Maternal Grandmother         Hypertension     Other - See Comments Maternal Grandfather         Peripheral vascular disease/ of myelodysplasia age 74 and CAD/ age 74 of coronary artery disease     Arthritis Paternal Grandmother         Arthritis     Heart Disease Paternal Grandfather         Heart Disease, of MI age 58     Family History Negative Other         Good Health     Family History Negative Sister         Good Health     History   Drug Use  "Unknown         Objective     /72 (BP Location: Right arm, Patient Position: Sitting, Cuff Size: Adult Regular)   Pulse 69   Temp 98.1  F (36.7  C)   Resp 16   Ht 1.676 m (5' 6\")   Wt 70.3 kg (155 lb)   SpO2 99%   BMI 25.02 kg/m      Physical Exam    GENERAL APPEARANCE: healthy, alert and no distress     EYES: EOMI, PERRL     HENT: ear canals and TM's normal and nose and mouth without ulcers or lesions     NECK: no adenopathy, no asymmetry, masses, or scars and thyroid normal to palpation     RESP: lungs clear to auscultation - no rales, rhonchi or wheezes     CV: regular rates and rhythm, normal S1 S2, no S3 or S4 and no murmur, click or rub     ABDOMEN:  soft, nontender, no HSM or masses and bowel sounds normal     MS: extremities normal- no gross deformities noted, no evidence of inflammation in joints, FROM in all extremities.     SKIN: no suspicious lesions or rashes     NEURO: Normal strength and tone, sensory exam grossly normal, mentation intact and speech normal     PSYCH: mentation appears normal. and affect normal/bright     LYMPHATICS: No cervical adenopathy    Recent Labs   Lab Test 10/22/20  1033   HGB 14.3         POTASSIUM 4.0   CR 0.80        Diagnostics:  Recent Results (from the past 24 hour(s))   Pregnancy, Urine (HCG)    Collection Time: 05/21/21  3:17 PM   Result Value Ref Range    HCG Qual Urine Negative NEG^Negative   Hemoglobin    Collection Time: 05/21/21  3:17 PM   Result Value Ref Range    Hemoglobin 13.9 11.7 - 15.7 g/dL      No EKG required, no history of coronary heart disease, significant arrhythmia, peripheral arterial disease or other structural heart disease.    Revised Cardiac Risk Index (RCRI):  The patient has the following serious cardiovascular risks for perioperative complications:   - No serious cardiac risks = 0 points     RCRI Interpretation: 0 points: Class I (very low risk - 0.4% complication rate)           Signed Electronically by: Sandy" PARAM Reza  Copy of this evaluation report is provided to requesting physician.

## 2021-06-01 DIAGNOSIS — Z32.00 POSSIBLE PREGNANCY, NOT YET CONFIRMED: ICD-10-CM

## 2021-06-01 LAB — HCG UR QL: POSITIVE

## 2021-06-01 PROCEDURE — 81025 URINE PREGNANCY TEST: CPT | Mod: ZL | Performed by: STUDENT IN AN ORGANIZED HEALTH CARE EDUCATION/TRAINING PROGRAM

## 2021-06-09 ENCOUNTER — VIRTUAL VISIT (OUTPATIENT)
Dept: OBGYN | Facility: OTHER | Age: 31
End: 2021-06-09
Attending: STUDENT IN AN ORGANIZED HEALTH CARE EDUCATION/TRAINING PROGRAM
Payer: COMMERCIAL

## 2021-06-09 VITALS — WEIGHT: 154 LBS | BODY MASS INDEX: 24.17 KG/M2 | HEIGHT: 67 IN

## 2021-06-09 DIAGNOSIS — Z32.01 PREGNANCY TEST POSITIVE: ICD-10-CM

## 2021-06-09 DIAGNOSIS — Z34.90 EARLY STAGE OF PREGNANCY: Primary | ICD-10-CM

## 2021-06-09 PROCEDURE — 99207 PR OB VISIT-NO CHARGE - GICH ONLY: CPT

## 2021-06-09 RX ORDER — LANOLIN ALCOHOL/MO/W.PET/CERES
CREAM (GRAM) TOPICAL DAILY
COMMUNITY
End: 2022-03-07

## 2021-06-09 ASSESSMENT — PATIENT HEALTH QUESTIONNAIRE - PHQ9: SUM OF ALL RESPONSES TO PHQ QUESTIONS 1-9: 0

## 2021-06-09 ASSESSMENT — MIFFLIN-ST. JEOR: SCORE: 1448.23

## 2021-06-16 ENCOUNTER — TELEPHONE (OUTPATIENT)
Dept: OBGYN | Facility: OTHER | Age: 31
End: 2021-06-16

## 2021-06-16 DIAGNOSIS — Z34.90 EARLY STAGE OF PREGNANCY: Primary | ICD-10-CM

## 2021-06-24 ENCOUNTER — PRENATAL OFFICE VISIT (OUTPATIENT)
Dept: OBGYN | Facility: OTHER | Age: 31
End: 2021-06-24
Attending: STUDENT IN AN ORGANIZED HEALTH CARE EDUCATION/TRAINING PROGRAM
Payer: COMMERCIAL

## 2021-06-24 ENCOUNTER — HOSPITAL ENCOUNTER (OUTPATIENT)
Dept: ULTRASOUND IMAGING | Facility: OTHER | Age: 31
End: 2021-06-24
Attending: STUDENT IN AN ORGANIZED HEALTH CARE EDUCATION/TRAINING PROGRAM
Payer: COMMERCIAL

## 2021-06-24 VITALS
HEIGHT: 66 IN | BODY MASS INDEX: 25.01 KG/M2 | HEART RATE: 72 BPM | WEIGHT: 155.6 LBS | SYSTOLIC BLOOD PRESSURE: 104 MMHG | DIASTOLIC BLOOD PRESSURE: 70 MMHG

## 2021-06-24 DIAGNOSIS — Z34.81 ENCOUNTER FOR SUPERVISION OF OTHER NORMAL PREGNANCY IN FIRST TRIMESTER: Primary | ICD-10-CM

## 2021-06-24 DIAGNOSIS — Z34.90 EARLY STAGE OF PREGNANCY: ICD-10-CM

## 2021-06-24 DIAGNOSIS — O21.9 NAUSEA AND VOMITING IN PREGNANCY: ICD-10-CM

## 2021-06-24 DIAGNOSIS — Z34.90 THIRD PREGNANCY: ICD-10-CM

## 2021-06-24 LAB
ABO + RH BLD: NORMAL
ABO + RH BLD: NORMAL
ALBUMIN UR-MCNC: NEGATIVE MG/DL
APPEARANCE UR: CLEAR
BACTERIA #/AREA URNS HPF: ABNORMAL /HPF
BILIRUB UR QL STRIP: NEGATIVE
BLD GP AB SCN SERPL QL: NORMAL
BLOOD BANK CMNT PATIENT-IMP: NORMAL
C TRACH DNA SPEC QL NAA+PROBE: NOT DETECTED
COLOR UR AUTO: YELLOW
ERYTHROCYTE [DISTWIDTH] IN BLOOD BY AUTOMATED COUNT: 11.9 % (ref 10–15)
GLUCOSE UR STRIP-MCNC: NEGATIVE MG/DL
HCT VFR BLD AUTO: 38.9 % (ref 35–47)
HGB BLD-MCNC: 13.4 G/DL (ref 11.7–15.7)
HGB UR QL STRIP: ABNORMAL
KETONES UR STRIP-MCNC: NEGATIVE MG/DL
LEUKOCYTE ESTERASE UR QL STRIP: ABNORMAL
MCH RBC QN AUTO: 30.5 PG (ref 26.5–33)
MCHC RBC AUTO-ENTMCNC: 34.4 G/DL (ref 31.5–36.5)
MCV RBC AUTO: 88 FL (ref 78–100)
N GONORRHOEA DNA SPEC QL NAA+PROBE: NOT DETECTED
NITRATE UR QL: NEGATIVE
NON-SQ EPI CELLS #/AREA URNS LPF: ABNORMAL /LPF
PH UR STRIP: 6 PH (ref 5–9)
PLATELET # BLD AUTO: 281 10E9/L (ref 150–450)
RBC # BLD AUTO: 4.4 10E12/L (ref 3.8–5.2)
RBC #/AREA URNS AUTO: ABNORMAL /HPF
SOURCE: ABNORMAL
SP GR UR STRIP: 1.02 (ref 1–1.03)
SPECIMEN EXP DATE BLD: NORMAL
SPECIMEN SOURCE: NORMAL
UROBILINOGEN UR STRIP-ACNC: 0.2 EU/DL (ref 0.2–1)
WBC # BLD AUTO: 8.5 10E9/L (ref 4–11)
WBC #/AREA URNS AUTO: ABNORMAL /HPF

## 2021-06-24 PROCEDURE — 99207 PR OB VISIT-NO CHARGE - GICH ONLY: CPT | Performed by: STUDENT IN AN ORGANIZED HEALTH CARE EDUCATION/TRAINING PROGRAM

## 2021-06-24 PROCEDURE — 36415 COLL VENOUS BLD VENIPUNCTURE: CPT | Mod: ZL | Performed by: STUDENT IN AN ORGANIZED HEALTH CARE EDUCATION/TRAINING PROGRAM

## 2021-06-24 PROCEDURE — 87086 URINE CULTURE/COLONY COUNT: CPT | Mod: ZL | Performed by: STUDENT IN AN ORGANIZED HEALTH CARE EDUCATION/TRAINING PROGRAM

## 2021-06-24 PROCEDURE — 86762 RUBELLA ANTIBODY: CPT | Mod: ZL | Performed by: STUDENT IN AN ORGANIZED HEALTH CARE EDUCATION/TRAINING PROGRAM

## 2021-06-24 PROCEDURE — G0123 SCREEN CERV/VAG THIN LAYER: HCPCS | Performed by: STUDENT IN AN ORGANIZED HEALTH CARE EDUCATION/TRAINING PROGRAM

## 2021-06-24 PROCEDURE — 86780 TREPONEMA PALLIDUM: CPT | Mod: ZL | Performed by: STUDENT IN AN ORGANIZED HEALTH CARE EDUCATION/TRAINING PROGRAM

## 2021-06-24 PROCEDURE — 87491 CHLMYD TRACH DNA AMP PROBE: CPT | Mod: ZL | Performed by: STUDENT IN AN ORGANIZED HEALTH CARE EDUCATION/TRAINING PROGRAM

## 2021-06-24 PROCEDURE — 86901 BLOOD TYPING SEROLOGIC RH(D): CPT | Mod: ZL | Performed by: STUDENT IN AN ORGANIZED HEALTH CARE EDUCATION/TRAINING PROGRAM

## 2021-06-24 PROCEDURE — 87591 N.GONORRHOEAE DNA AMP PROB: CPT | Mod: ZL | Performed by: STUDENT IN AN ORGANIZED HEALTH CARE EDUCATION/TRAINING PROGRAM

## 2021-06-24 PROCEDURE — 87389 HIV-1 AG W/HIV-1&-2 AB AG IA: CPT | Mod: ZL | Performed by: STUDENT IN AN ORGANIZED HEALTH CARE EDUCATION/TRAINING PROGRAM

## 2021-06-24 PROCEDURE — 81001 URINALYSIS AUTO W/SCOPE: CPT | Mod: ZL | Performed by: STUDENT IN AN ORGANIZED HEALTH CARE EDUCATION/TRAINING PROGRAM

## 2021-06-24 PROCEDURE — 86850 RBC ANTIBODY SCREEN: CPT | Mod: ZL | Performed by: STUDENT IN AN ORGANIZED HEALTH CARE EDUCATION/TRAINING PROGRAM

## 2021-06-24 PROCEDURE — 76801 OB US < 14 WKS SINGLE FETUS: CPT

## 2021-06-24 PROCEDURE — 86900 BLOOD TYPING SEROLOGIC ABO: CPT | Mod: ZL | Performed by: STUDENT IN AN ORGANIZED HEALTH CARE EDUCATION/TRAINING PROGRAM

## 2021-06-24 PROCEDURE — 85027 COMPLETE CBC AUTOMATED: CPT | Mod: ZL | Performed by: STUDENT IN AN ORGANIZED HEALTH CARE EDUCATION/TRAINING PROGRAM

## 2021-06-24 PROCEDURE — 87340 HEPATITIS B SURFACE AG IA: CPT | Mod: ZL | Performed by: STUDENT IN AN ORGANIZED HEALTH CARE EDUCATION/TRAINING PROGRAM

## 2021-06-24 RX ORDER — PROCHLORPERAZINE MALEATE 10 MG
10 TABLET ORAL EVERY 6 HOURS PRN
Qty: 20 TABLET | Refills: 0 | Status: SHIPPED | OUTPATIENT
Start: 2021-06-24 | End: 2021-12-22

## 2021-06-24 RX ORDER — METOCLOPRAMIDE 10 MG/1
10 TABLET ORAL 2 TIMES DAILY PRN
Qty: 20 TABLET | Refills: 0 | Status: SHIPPED | OUTPATIENT
Start: 2021-06-24 | End: 2021-12-22

## 2021-06-24 ASSESSMENT — MIFFLIN-ST. JEOR: SCORE: 1447.55

## 2021-06-24 ASSESSMENT — PAIN SCALES - GENERAL: PAINLEVEL: NO PAIN (0)

## 2021-06-24 NOTE — PROGRESS NOTES
New OB Visit    Chief Complaint: Establish care for a new pregnancy    History of Present Illness:  Ms. Rowena Mcqueen is a 30 year old yo  here today for a new OB visit. She reports her LMP as Patient's last menstrual period was 2021 (exact date).  She reports early pregnancy symptoms including nausea &/or vomiting. She IS taking prenatal vitamins at this time. This pregnancy was not planned, but welcomed. The patient reports that she is excited about the pregnancy. Father of the baby, Manuel, is involved.  We discussed some warning signs to be alert for that could suggest spontaneous miscarriage including vaginal bleeding, cramping as well as what symptoms should prompt medical evaluation in clinic or the ER.     We also discussed genetic screening including [place] non-invasive testing, carrier screening for SMA and CF and the [place] screen. All of these tests were offered to the family and they have decided on [place] and will see if she has previously had SMA and CF screening at this time.     Medical History:  Past Medical History:   Diagnosis Date     Contact dermatitis and eczema 2021    Formatting of this note might be different from the original. Scalp dermatitis, dermatological consult pending.     History of other genital system and obstetric disorders     Premature at 37 weeks with birth weight 5 lbs 15 oz requiring oxygen first 36 hours of life.     She denies any chronic medical conditions: specifically denies asthma, HTN, DM    Obstetric History:   :   G2:   Pelvis proven to 7 lb 6 oz      GYN History:  No history of STIs  Last pap smear: 2018 NIL  No history of abnormal pap smears    Past Surgical History:  Past Surgical History:   Procedure Laterality Date     WISDOM TOOTH EXTRACTION         Family Medical History:  Family History   Problem Relation Age of Onset     Family History Negative Mother         Good Health     Other - See Comments Father          Psychiatric illness,ADHD and depression     Family History Negative Brother         Good Health     Hypertension Maternal Grandmother         Hypertension     Other - See Comments Maternal Grandfather         Peripheral vascular disease/ of myelodysplasia age 74 and CAD/ age 75 of coronary artery disease     Arthritis Paternal Grandmother         Arthritis     Heart Disease Paternal Grandfather         Heart Disease, of MI age 58     Family History Negative Other         Good Health     Family History Negative Sister         Good Health     Specifically denies breast, ovarian, endometrial and colon cancers in her family  She also is not aware of any familial thrombophilias and coagulopathies in her family    Medications:  Current Outpatient Medications   Medication     Cholecalciferol (VITAMIN D3) 50 MCG ( [place]) CAPS     cyanocobalamin (VITAMIN B-12) 1000 MCG tablet     Prenatal Vit-Fe Fumarate-FA (PRENATAL MULTIVITAMIN  PLUS IRON) 27-1 MG TABS     No current facility-administered medications for this visit.      Allergies:     Allergies   Allergen Reactions     Food Itching and Swelling     Apples- Gums swell and throat itches  Hazelnuts - Itchy throat     Seasonal Allergies      Penicillins Hives and Rash     Other reaction(s): Throat Swelling/Closing   May not have hazelnut allergy    Social History:  Social History     Tobacco Use     Smoking status: Never Smoker     Smokeless tobacco: Never Used   Substance Use Topics     Alcohol use: Not Currently     Comment: Alcoholic Drinks/day: occasional     Drug use: Never     She lives at home with her  and sons  No tobacco, alcohol or drug use in this pregnancy    ROS:   Skin: negative for rash, bruising  Eyes: negative for visual blurring, double vision  Ears/Nose/Throat: negative for nasal congestion, vertigo  Respiratory: No shortness of breath, dyspnea on exertion, cough, or hemoptysis  Cardiovascular: negative for palpitations, chest pain,  "lower extremity edema and syncope or near-syncope  Gastrointestinal: negative for, nausea, vomiting and hematemesis  Genitourinary: negative for, dysuria, frequency and urgency  Musculoskeletal: negative for, back pain and muscular weakness  Neurologic: negative for, headaches, syncope, seizures and local weakness  Psychiatric: negative for, anxiety, depression and hallucinations  Hematologic/Lymphatic/Immunologic: negative for, anemia, chills and fever      Exam:  /70   Pulse 72   Ht 1.676 m (5' 6\")   Wt 70.6 kg (155 lb 9.6 oz)   LMP 2021 (Exact Date)   Breastfeeding No   BMI 25.11 kg/m      General: No acute distress, well-appearing  Neck: Normal thyroid gland on palpation without nodules, enlargement or pain  Breast: Normal appearing skin on breasts bilaterally, No nodules or masses palpated, no nipple discharge or bleeding  Cardiac: Normal rate, regular rhythm, no murmurs, gallops or rubs, normal perfusion to extremities  Lungs: Clear on auscultation, no wheezing, non-labored respirations  Abdomen: Soft, non-tender, no masses  Pelvic exam: Normal appearing external genitalia, normal appearing vaginal walls with pink ruggae. No noted vaginal discharge or lesions. Cervix is closed without masses, nodules, erythema or discharge at the os.     Labs:  HCG Qual Urine   Date Value Ref Range Status   2021 Positive (A) NEG^Negative Final     Comment:     This test is for screening purposes.  Results should be interpreted along with   the clinical picture.  Confirmation testing is available if warranted by   ordering [id number], HCG Quantitative Pregnancy.     ]    Dating ultrasound: Done     ROBIN based on LMP 2022- ROBIN based on US 2022. Per ACOG recommendations these dates are greater than 5 days different, thus should be changed to US dating. Final ROBIN will be 2022 based on 7w5d.    Assessment:  Ms. Rowena Mcqueen is a 30 year old yo  here today to establish care for this " pregnancy. Her pregnancy is complicated by ovarian cyst (stable 7 cm, simple).    Plan:  # Routine Prenatal Care  -- Datinw5d ROBIN: 2022  -- PNLs: collected today including the following   CBC   RPR   Hep B S Ag   Rubella   HIV   ABO/Rh type   Antibody Screen    -- Genetic Screening: Discussed with patient, she has decided on [place] at 10 weeks and will decide later on CF and SMA (calling clinic to see if had this previously)  -- Anatomy US: Planned for approximately 20 weeks gestation. Currently no indication for Level II  -- Immunizations: Plans for Tdap at approximately 27 weeks gestation  -- 3rd TM labs including CBC, RPR: Planned for after 27 weeks gestation  -- 1 hr GTT: Planned for  24-28 weeks   -- GBS: Planned for 36 weeks  -- Postpartum Planning: To be discussed   -- Delivery Planning: No indication for early IOL at this time. To be discussed continually  -- Return to clinic in 4 weeks for OB follow up visit  -- Planning to do at next visit: Follow up genetic screening decision    # Left adnexal cyst   -- 7.5 cm left adnexal cyst, simple in appearance stable

## 2021-06-24 NOTE — NURSING NOTE
Pt presents to clinic today for ob physical.      Medication Reconciliation: complete  Cori Thayer LPN

## 2021-06-25 LAB
HBV SURFACE AG SERPL QL IA: NONREACTIVE
HIV 1+2 AB+HIV1 P24 AG SERPL QL IA: NONREACTIVE
T PALLIDUM AB SER QL: NONREACTIVE

## 2021-06-26 LAB
BACTERIA SPEC CULT: NO GROWTH
SPECIMEN SOURCE: NORMAL

## 2021-06-28 LAB — RUBV IGG SERPL IA-ACNC: 55 IU/ML

## 2021-07-06 LAB
COPATH REPORT: NORMAL
PAP: NORMAL

## 2021-07-15 ENCOUNTER — LAB (OUTPATIENT)
Dept: LAB | Facility: OTHER | Age: 31
End: 2021-07-15
Attending: STUDENT IN AN ORGANIZED HEALTH CARE EDUCATION/TRAINING PROGRAM
Payer: COMMERCIAL

## 2021-07-21 ENCOUNTER — PRENATAL OFFICE VISIT (OUTPATIENT)
Dept: OBGYN | Facility: OTHER | Age: 31
End: 2021-07-21
Attending: STUDENT IN AN ORGANIZED HEALTH CARE EDUCATION/TRAINING PROGRAM
Payer: COMMERCIAL

## 2021-07-21 VITALS
DIASTOLIC BLOOD PRESSURE: 70 MMHG | HEART RATE: 86 BPM | SYSTOLIC BLOOD PRESSURE: 108 MMHG | BODY MASS INDEX: 25.07 KG/M2 | WEIGHT: 155.3 LBS

## 2021-07-21 DIAGNOSIS — Z34.81 ENCOUNTER FOR SUPERVISION OF OTHER NORMAL PREGNANCY IN FIRST TRIMESTER: Primary | ICD-10-CM

## 2021-07-21 PROCEDURE — 99207 PR OB VISIT-NO CHARGE - GICH ONLY: CPT | Performed by: STUDENT IN AN ORGANIZED HEALTH CARE EDUCATION/TRAINING PROGRAM

## 2021-07-21 ASSESSMENT — PAIN SCALES - GENERAL: PAINLEVEL: NO PAIN (0)

## 2021-07-21 NOTE — PROGRESS NOTES
Return OB Visit    S: Ms. Mcqueen is feeling well today. She has no acute concerns. Denies leaking of fluid, vaginal bleeding, painful contractions.    O: /70   Pulse 86   Wt 70.4 kg (155 lb 4.8 oz)   LMP 2021 (Exact Date)   BMI 25.07 kg/m    Gen: Well-appearing, NAD      FHT and fetal movements noted on bedside US    Assessment:  Ms. Rowena Mcqueen is a 30 year old yo  here for an OB follow up visit. This pregnancy is complicated by adnexal cyst.    Assessment:  Ms. Rowena Mcqueen is a 30 year old yo  here for OB follow up. She is currently 11w4d. Her pregnancy is complicated by ovarian cyst (stable 7 cm, simple).     Plan:  # Routine Prenatal Care  -- Datinw5d ROBIN: 2022  -- PNLs:              O+, Ab screen neg              RPR nr              Hep B S Ag neg              Rubella immune              HIV neg              NIL pap     -- Genetic Screening: [place] pending  -- Anatomy US: Planned for approximately 20 weeks gestation. Currently no indication for Level II  -- Immunizations: Plans for Tdap at approximately 27 weeks gestation  -- 3rd TM labs including CBC, RPR: Planned for after 27 weeks gestation  -- 1 hr GTT: Planned for  24-28 weeks   -- GBS: Planned for 36 weeks  -- Postpartum Planning: To be discussed   -- Delivery Planning: No indication for early IOL at this time. To be discussed continually  -- Return to clinic in 4 weeks for OB follow up visit  -- Planning to do at next visit: order anatomy US     # Left adnexal cyst   -- 7.5 cm left adnexal cyst, simple in appearance stable

## 2021-07-21 NOTE — NURSING NOTE
Pt presents to clinic today for prenatal care 11w4d.       Medication Reconciliation: complete  Cori Thayer LPN

## 2021-08-25 ENCOUNTER — PRENATAL OFFICE VISIT (OUTPATIENT)
Dept: OBGYN | Facility: OTHER | Age: 31
End: 2021-08-25
Attending: STUDENT IN AN ORGANIZED HEALTH CARE EDUCATION/TRAINING PROGRAM
Payer: COMMERCIAL

## 2021-08-25 VITALS
BODY MASS INDEX: 25.02 KG/M2 | WEIGHT: 155 LBS | SYSTOLIC BLOOD PRESSURE: 108 MMHG | HEART RATE: 80 BPM | DIASTOLIC BLOOD PRESSURE: 60 MMHG

## 2021-08-25 DIAGNOSIS — N94.9 ADNEXAL CYST: Primary | ICD-10-CM

## 2021-08-25 DIAGNOSIS — Z34.92 SECOND TRIMESTER PREGNANCY: ICD-10-CM

## 2021-08-25 PROCEDURE — 99207 PR OB VISIT-NO CHARGE - GICH ONLY: CPT | Performed by: STUDENT IN AN ORGANIZED HEALTH CARE EDUCATION/TRAINING PROGRAM

## 2021-08-25 ASSESSMENT — PAIN SCALES - GENERAL: PAINLEVEL: NO PAIN (1)

## 2021-08-25 NOTE — NURSING NOTE
FOOD SECURITY SCREENING QUESTIONS  Hunger Vital Signs:  Within the past 12 months we worried whether our food would run out before we got money to buy more. Never  Within the past 12 months the food we bought just didn't last and we didn't have money to get more. Never  Christy Orta LPN 8/25/2021 11:27 AM    Chief Complaint   Patient presents with     Prenatal Care     16w4d     Left knee has been painful.   Christy Orta LPN........................8/25/2021  11:28 AM     Medication Reconciliation: completed   Christy Orta LPN  8/25/2021 11:27 AM

## 2021-08-25 NOTE — PROGRESS NOTES
Return OB Visit    S: Ms. Mcqueen is feeling well today. She has no acute concerns. Denies leaking of fluid, vaginal bleeding, painful contractions. Notes fetal movements.    O:   /60 (BP Location: Right arm, Patient Position: Sitting, Cuff Size: Adult Regular)   Pulse 80   Wt 70.3 kg (155 lb)   LMP 2021 (Exact Date)   Breastfeeding No   BMI 25.02 kg/m      Gen: Well-appearing, NAD  See OB Flowsheet     bpm    Assessment:  Ms. Rowena Mcqueen is a 30 year old yo  here for OB follow up. She is currently 16w4d. Her pregnancy is complicated by ovarian cyst (stable 7 cm, simple).     Plan:  # Routine Prenatal Care  -- Datinw5d ROBIN: 2022  -- PNLs:              O+, Ab screen neg              RPR nr              Hep B S Ag neg              Rubella immune              HIV neg              NIL pap     -- Genetic Screening: [place] low risk male,   -- Anatomy US: Planned for approximately 20 weeks gestation. Currently no indication for Level II (ordered for next visit)  -- Immunizations: Plans for Tdap at approximately 27 weeks gestation  -- 3rd TM labs including CBC, RPR: Planned for after 27 weeks gestation  -- 1 hr GTT: Planned for  24-28 weeks   -- GBS: Planned for 36 weeks  -- Postpartum Planning: To be discussed   -- Delivery Planning: No indication for early IOL at this time. To be discussed continually  -- Return to clinic in 4 weeks for OB follow up visit  -- Planning to do at next visit: discuss upcoming 1 hr GTT     # Left adnexal cyst   -- 7.5 cm left adnexal cyst, simple in appearance stable      # Left knee pain/potential sciatica

## 2021-09-08 ENCOUNTER — PRENATAL OFFICE VISIT (OUTPATIENT)
Dept: OBGYN | Facility: OTHER | Age: 31
End: 2021-09-08
Attending: STUDENT IN AN ORGANIZED HEALTH CARE EDUCATION/TRAINING PROGRAM
Payer: COMMERCIAL

## 2021-09-08 VITALS
WEIGHT: 155.2 LBS | HEART RATE: 72 BPM | DIASTOLIC BLOOD PRESSURE: 82 MMHG | BODY MASS INDEX: 25.05 KG/M2 | SYSTOLIC BLOOD PRESSURE: 112 MMHG

## 2021-09-08 DIAGNOSIS — M79.601 PAIN OF RIGHT UPPER EXTREMITY: ICD-10-CM

## 2021-09-08 DIAGNOSIS — Z34.92 SECOND TRIMESTER PREGNANCY: Primary | ICD-10-CM

## 2021-09-08 PROCEDURE — 99207 PR OB VISIT-NO CHARGE - GICH ONLY: CPT | Performed by: STUDENT IN AN ORGANIZED HEALTH CARE EDUCATION/TRAINING PROGRAM

## 2021-09-08 RX ORDER — CYCLOBENZAPRINE HCL 5 MG
5 TABLET ORAL 3 TIMES DAILY PRN
Qty: 15 TABLET | Refills: 0 | Status: SHIPPED | OUTPATIENT
Start: 2021-09-08 | End: 2021-12-22

## 2021-09-08 RX ORDER — GABAPENTIN 100 MG/1
100 CAPSULE ORAL 3 TIMES DAILY
Qty: 30 CAPSULE | Refills: 0 | Status: SHIPPED | OUTPATIENT
Start: 2021-09-08 | End: 2021-12-22

## 2021-09-08 ASSESSMENT — PAIN SCALES - GENERAL: PAINLEVEL: EXTREME PAIN (9)

## 2021-09-08 NOTE — NURSING NOTE
"  Chief Complaint   Patient presents with     Prenatal Care     18w4d     Patient reports right arm pain from neck to wrist. 5-9/10. Started yesterday.    Initial /82 (BP Location: Right arm, Patient Position: Sitting, Cuff Size: Adult Regular)   Pulse 72   Wt 70.4 kg (155 lb 3.2 oz)   LMP 04/25/2021 (Exact Date)   Breastfeeding No   BMI 25.05 kg/m   Estimated body mass index is 25.05 kg/m  as calculated from the following:    Height as of 6/24/21: 1.676 m (5' 6\").    Weight as of this encounter: 70.4 kg (155 lb 3.2 oz).  Medication Reconciliation: complete    Joelle Brambila RN  "

## 2021-09-13 ENCOUNTER — HOSPITAL ENCOUNTER (OUTPATIENT)
Dept: ULTRASOUND IMAGING | Facility: OTHER | Age: 31
Discharge: HOME | End: 2021-09-13
Attending: STUDENT IN AN ORGANIZED HEALTH CARE EDUCATION/TRAINING PROGRAM | Admitting: STUDENT IN AN ORGANIZED HEALTH CARE EDUCATION/TRAINING PROGRAM
Payer: COMMERCIAL

## 2021-09-13 DIAGNOSIS — Z34.92 SECOND TRIMESTER PREGNANCY: ICD-10-CM

## 2021-09-13 PROCEDURE — 76805 OB US >/= 14 WKS SNGL FETUS: CPT

## 2021-10-06 ENCOUNTER — PRENATAL OFFICE VISIT (OUTPATIENT)
Dept: OBGYN | Facility: OTHER | Age: 31
End: 2021-10-06
Attending: STUDENT IN AN ORGANIZED HEALTH CARE EDUCATION/TRAINING PROGRAM
Payer: COMMERCIAL

## 2021-10-06 VITALS
BODY MASS INDEX: 25.76 KG/M2 | WEIGHT: 159.6 LBS | DIASTOLIC BLOOD PRESSURE: 62 MMHG | HEART RATE: 86 BPM | SYSTOLIC BLOOD PRESSURE: 102 MMHG

## 2021-10-06 DIAGNOSIS — Z34.92 SECOND TRIMESTER PREGNANCY: Primary | ICD-10-CM

## 2021-10-06 DIAGNOSIS — N94.9 ADNEXAL CYST: ICD-10-CM

## 2021-10-06 PROCEDURE — 99207 PR OB VISIT-NO CHARGE - GICH ONLY: CPT | Performed by: STUDENT IN AN ORGANIZED HEALTH CARE EDUCATION/TRAINING PROGRAM

## 2021-10-06 NOTE — NURSING NOTE
Pt presents to clinic today for prenatal care 22w4d. Pt denies any contractions, bleeding, or leakage of fluid at this time. States baby is moving.        Medication Reconciliation: complete  Cori Thayer LPN

## 2021-10-06 NOTE — PROGRESS NOTES
Return OB Visit    S: Ms. Mcqueen is feeling well today. She has no acute concerns. Denies leaking of fluid, vaginal bleeding, painful contractions. Notes fetal movements.    O:   /62   Pulse 86   Wt 72.4 kg (159 lb 9.6 oz)   LMP 2021 (Exact Date)   BMI 25.76 kg/m      Gen: Well-appearing, NAD  See OB Flowsheet    FH 22 cm   bpm    Assessment:  Ms. Rowena Mcqueen is a 30 year old yo  here for OB follow up. She is currently 22w4d. Her pregnancy is complicated by ovarian cyst (stable 7 cm, simple).     Plan:  # Routine Prenatal Care  -- Datinw5d ROBIN: 2022  -- PNLs:              O+, Ab screen neg              RPR nr              Hep B S Ag neg              Rubella immune              HIV neg              NIL pap     -- Genetic Screening: [place] low risk male,   -- Anatomy US: Normal anatomy from   -- Immunizations: Plans for Tdap at approximately 27 weeks gestation  -- 3rd TM labs including CBC, RPR: Planned for after 27 weeks gestation  -- 1 hr GTT: Planned for next visit  -- GBS: Planned for 36 weeks  -- Postpartum Planning: To be discussed   -- Delivery Planning: No indication for early IOL at this time. To be discussed continually  -- Return to clinic in 4 weeks for OB follow up visit  -- Planning to do at next visit: discuss upcoming 1 hr GTT     # Right arm pain  -- Sounds to be more like nerve pain with tingling/pins/needle sensation and the distribution as it starts in neck and radiates down arm  -- Also appears to have muscle tension along neck/shoulder  -- PT referral, reviewed we can try flexeril and gabapentin in very short course (reviewed pregnancy Cat C) as she is getting started with PT. Encouraged continued warmth pack, ice if it helps, massage and stretches.   -- Discussed return precautions and warning signs for ER evaluation including worsening of pain, chest pain, shortness of breath, neurological symptoms, loss in strength.     # Left adnexal cyst    -- 7.5 cm left adnexal cyst, simple in appearance stable       # Left knee pain/potential sciatica  -- home exercises helping    Elsa Rene MD  OB/GYN  10/6/2021 1:29 PM

## 2021-10-09 ENCOUNTER — HEALTH MAINTENANCE LETTER (OUTPATIENT)
Age: 31
End: 2021-10-09

## 2021-10-14 ENCOUNTER — LAB (OUTPATIENT)
Dept: LAB | Facility: OTHER | Age: 31
End: 2021-10-14
Attending: STUDENT IN AN ORGANIZED HEALTH CARE EDUCATION/TRAINING PROGRAM
Payer: COMMERCIAL

## 2021-10-14 DIAGNOSIS — O23.42 URINARY TRACT INFECTION IN MOTHER DURING SECOND TRIMESTER OF PREGNANCY: Primary | ICD-10-CM

## 2021-10-14 DIAGNOSIS — R30.0 DYSURIA: ICD-10-CM

## 2021-10-14 LAB
ALBUMIN UR-MCNC: NEGATIVE MG/DL
APPEARANCE UR: ABNORMAL
BACTERIA #/AREA URNS HPF: ABNORMAL /HPF
BILIRUB UR QL STRIP: NEGATIVE
COLOR UR AUTO: YELLOW
GLUCOSE UR STRIP-MCNC: NEGATIVE MG/DL
HGB UR QL STRIP: NEGATIVE
KETONES UR STRIP-MCNC: NEGATIVE MG/DL
LEUKOCYTE ESTERASE UR QL STRIP: ABNORMAL
NITRATE UR QL: NEGATIVE
PH UR STRIP: 7 [PH] (ref 5–9)
RBC URINE: 15 /HPF
SP GR UR STRIP: 1.01 (ref 1–1.03)
SQUAMOUS EPITHELIAL: 22 /HPF
UROBILINOGEN UR STRIP-MCNC: NORMAL MG/DL
WBC URINE: 63 /HPF

## 2021-10-14 PROCEDURE — 87086 URINE CULTURE/COLONY COUNT: CPT | Mod: ZL

## 2021-10-14 PROCEDURE — 81001 URINALYSIS AUTO W/SCOPE: CPT | Mod: ZL

## 2021-10-14 RX ORDER — NITROFURANTOIN 25; 75 MG/1; MG/1
100 CAPSULE ORAL 2 TIMES DAILY
Qty: 14 CAPSULE | Refills: 0 | Status: SHIPPED | OUTPATIENT
Start: 2021-10-14 | End: 2021-12-22

## 2021-10-17 LAB — BACTERIA UR CULT: NO GROWTH

## 2021-10-25 ENCOUNTER — TELEPHONE (OUTPATIENT)
Dept: OBGYN | Facility: OTHER | Age: 31
End: 2021-10-25

## 2021-10-25 NOTE — TELEPHONE ENCOUNTER
Call returned to patient who states that she is feeling pressure in her uterus. She notes contractions 3-5 minutes apart for the last 1.5 hours. No leaking of fluid or bleeding. Baby is moving a little less today. Discussed resting and drinking water to see if contractions slowed down for one hour and seeing if baby moved more. She will do so. Discussed that if she does not have contractions slow down and instead increase she will present to [facility] as soon as they do so. She will notify the clinic in one hour if the contractions stop.     Nikki Gibson RN on 10/25/2021 at 1:31 PM

## 2021-10-25 NOTE — TELEPHONE ENCOUNTER
Call to patient who states that her contractions have lessened and become infrequent. Discussed warning signs on when to present to [facility] such as worsening of contractions. She is in agreement.     Nikki Gibson RN on 10/25/2021 at 3:50 PM

## 2021-11-11 ENCOUNTER — PRENATAL OFFICE VISIT (OUTPATIENT)
Dept: OBGYN | Facility: OTHER | Age: 31
End: 2021-11-11
Attending: STUDENT IN AN ORGANIZED HEALTH CARE EDUCATION/TRAINING PROGRAM
Payer: COMMERCIAL

## 2021-11-11 VITALS
WEIGHT: 161.8 LBS | HEART RATE: 75 BPM | DIASTOLIC BLOOD PRESSURE: 58 MMHG | RESPIRATION RATE: 18 BRPM | BODY MASS INDEX: 26.12 KG/M2 | SYSTOLIC BLOOD PRESSURE: 100 MMHG | OXYGEN SATURATION: 100 %

## 2021-11-11 DIAGNOSIS — N83.201 CYST OF RIGHT OVARY: Primary | ICD-10-CM

## 2021-11-11 DIAGNOSIS — Z34.92 SECOND TRIMESTER PREGNANCY: ICD-10-CM

## 2021-11-11 LAB
BASOPHILS # BLD AUTO: 0 10E3/UL (ref 0–0.2)
BASOPHILS NFR BLD AUTO: 0 %
EOSINOPHIL # BLD AUTO: 0.1 10E3/UL (ref 0–0.7)
EOSINOPHIL NFR BLD AUTO: 1 %
ERYTHROCYTE [DISTWIDTH] IN BLOOD BY AUTOMATED COUNT: 12 % (ref 10–15)
GLUCOSE 1H P 50 G GLC PO SERPL-MCNC: 122 MG/DL (ref 70–129)
HCT VFR BLD AUTO: 30.8 % (ref 35–47)
HGB BLD-MCNC: 10.6 G/DL (ref 11.7–15.7)
IMM GRANULOCYTES # BLD: 0.1 10E3/UL
IMM GRANULOCYTES NFR BLD: 1 %
LYMPHOCYTES # BLD AUTO: 1.4 10E3/UL (ref 0.8–5.3)
LYMPHOCYTES NFR BLD AUTO: 22 %
MCH RBC QN AUTO: 31.4 PG (ref 26.5–33)
MCHC RBC AUTO-ENTMCNC: 34.4 G/DL (ref 31.5–36.5)
MCV RBC AUTO: 91 FL (ref 78–100)
MONOCYTES # BLD AUTO: 0.4 10E3/UL (ref 0–1.3)
MONOCYTES NFR BLD AUTO: 5 %
NEUTROPHILS # BLD AUTO: 4.5 10E3/UL (ref 1.6–8.3)
NEUTROPHILS NFR BLD AUTO: 71 %
NRBC # BLD AUTO: 0 10E3/UL
NRBC BLD AUTO-RTO: 0 /100
PLATELET # BLD AUTO: 242 10E3/UL (ref 150–450)
RBC # BLD AUTO: 3.38 10E6/UL (ref 3.8–5.2)
WBC # BLD AUTO: 6.5 10E3/UL (ref 4–11)

## 2021-11-11 PROCEDURE — 90715 TDAP VACCINE 7 YRS/> IM: CPT | Performed by: STUDENT IN AN ORGANIZED HEALTH CARE EDUCATION/TRAINING PROGRAM

## 2021-11-11 PROCEDURE — 99207 PR OB VISIT-NO CHARGE - GICH ONLY: CPT | Performed by: STUDENT IN AN ORGANIZED HEALTH CARE EDUCATION/TRAINING PROGRAM

## 2021-11-11 PROCEDURE — 82952 GTT-ADDED SAMPLES: CPT | Mod: ZL | Performed by: STUDENT IN AN ORGANIZED HEALTH CARE EDUCATION/TRAINING PROGRAM

## 2021-11-11 PROCEDURE — 90471 IMMUNIZATION ADMIN: CPT | Performed by: STUDENT IN AN ORGANIZED HEALTH CARE EDUCATION/TRAINING PROGRAM

## 2021-11-11 PROCEDURE — 36415 COLL VENOUS BLD VENIPUNCTURE: CPT | Mod: ZL | Performed by: STUDENT IN AN ORGANIZED HEALTH CARE EDUCATION/TRAINING PROGRAM

## 2021-11-11 PROCEDURE — 86780 TREPONEMA PALLIDUM: CPT | Mod: ZL | Performed by: STUDENT IN AN ORGANIZED HEALTH CARE EDUCATION/TRAINING PROGRAM

## 2021-11-11 PROCEDURE — 85025 COMPLETE CBC W/AUTO DIFF WBC: CPT | Mod: ZL | Performed by: STUDENT IN AN ORGANIZED HEALTH CARE EDUCATION/TRAINING PROGRAM

## 2021-11-11 ASSESSMENT — PAIN SCALES - GENERAL: PAINLEVEL: NO PAIN (0)

## 2021-11-11 NOTE — NURSING NOTE
"FOOD SECURITY SCREENING QUESTIONS  Hunger Vital Signs:  Within the past 12 months we worried whether our food would run out before we got money to buy more. Never  Within the past 12 months the food we bought just didn't last and we didn't have money to get more. Never    Chief Complaint   Patient presents with     Prenatal Care     27w5d       Initial /58 (BP Location: Right arm, Patient Position: Sitting, Cuff Size: Adult Regular)   Pulse 75   Resp 18   Wt 73.4 kg (161 lb 12.8 oz)   LMP 04/25/2021 (Exact Date)   SpO2 100%   BMI 26.12 kg/m   Estimated body mass index is 26.12 kg/m  as calculated from the following:    Height as of 6/24/21: 1.676 m (5' 6\").    Weight as of this encounter: 73.4 kg (161 lb 12.8 oz).  Medication Reconciliation: complete    Nikki Gibson RN    "

## 2021-11-11 NOTE — PROGRESS NOTES
Return OB Visit    S: Ms. Mcqueen is feeling well today. She has no acute concerns. Denies leaking of fluid, vaginal bleeding, painful contractions. Notes fetal movements.    O:   /58 (BP Location: Right arm, Patient Position: Sitting, Cuff Size: Adult Regular)   Pulse 75   Resp 18   Wt 73.4 kg (161 lb 12.8 oz)   LMP 2021 (Exact Date)   SpO2 100%   BMI 26.12 kg/m      Gen: Well-appearing, NAD  See OB Flowsheet    FH 26 cm   bpm    Assessment:  Ms. Rowena Mcqueen is a 30 year old yo  here for OB follow up. She is currently 27w5d. Her pregnancy is complicated by ovarian cyst (stable 7 cm, simple).     Plan:  # Routine Prenatal Care  -- Datinw5d ROBIN: 2022  -- PNLs:              O+, Ab screen neg              RPR nr              Hep B S Ag neg              Rubella immune              HIV neg              NIL pap     -- Genetic Screening: [place] low risk male,   -- Anatomy US: Normal anatomy from   -- Immunizations: Tdap today  -- 3rd TM labs including CBC, RPR: Today  -- 1 hr GTT: Today  -- GBS: Planned for 36 weeks  -- Postpartum Planning: To be discussed   -- Delivery Planning: No indication for early IOL at this time. To be discussed continually  -- Return to clinic in 4 weeks for OB follow up visit  -- Planning to do at next visit: Follow up labs     # Right arm pain  -- Sounds to be more like nerve pain with tingling/pins/needle sensation and the distribution as it starts in neck and radiates down arm  -- Also appears to have muscle tension along neck/shoulder  -- PT referral, reviewed we can try flexeril and gabapentin in very short course (reviewed pregnancy Cat C) as she is getting started with PT. Encouraged continued warmth pack, ice if it helps, massage and stretches.   -- Discussed return precautions and warning signs for ER evaluation including worsening of pain, chest pain, shortness of breath, neurological symptoms, loss in strength.     # Left adnexal  cyst   -- 7.5 cm left adnexal cyst, simple in appearance stable    -- US ordered today     # Left knee pain/potential sciatica  -- home exercises helping    Elsa Rene MD  OB/GYN  11/11/2021 1:02 PM

## 2021-11-12 LAB — T PALLIDUM AB SER QL: NONREACTIVE

## 2021-11-29 ENCOUNTER — HOSPITAL ENCOUNTER (OUTPATIENT)
Dept: ULTRASOUND IMAGING | Facility: OTHER | Age: 31
Discharge: HOME | End: 2021-11-29
Attending: STUDENT IN AN ORGANIZED HEALTH CARE EDUCATION/TRAINING PROGRAM | Admitting: STUDENT IN AN ORGANIZED HEALTH CARE EDUCATION/TRAINING PROGRAM
Payer: COMMERCIAL

## 2021-11-29 DIAGNOSIS — Z34.92 SECOND TRIMESTER PREGNANCY: ICD-10-CM

## 2021-11-29 DIAGNOSIS — N83.201 CYST OF RIGHT OVARY: ICD-10-CM

## 2021-11-29 PROCEDURE — 76816 OB US FOLLOW-UP PER FETUS: CPT

## 2021-12-01 ENCOUNTER — PRENATAL OFFICE VISIT (OUTPATIENT)
Dept: OBGYN | Facility: OTHER | Age: 31
End: 2021-12-01
Attending: STUDENT IN AN ORGANIZED HEALTH CARE EDUCATION/TRAINING PROGRAM
Payer: COMMERCIAL

## 2021-12-01 VITALS
DIASTOLIC BLOOD PRESSURE: 70 MMHG | BODY MASS INDEX: 26.31 KG/M2 | SYSTOLIC BLOOD PRESSURE: 110 MMHG | WEIGHT: 163 LBS | HEART RATE: 62 BPM

## 2021-12-01 DIAGNOSIS — Z34.92 SECOND TRIMESTER PREGNANCY: ICD-10-CM

## 2021-12-01 DIAGNOSIS — N83.201 CYST OF RIGHT OVARY: Primary | ICD-10-CM

## 2021-12-01 PROCEDURE — 99207 PR OB VISIT-NO CHARGE - GICH ONLY: CPT | Performed by: STUDENT IN AN ORGANIZED HEALTH CARE EDUCATION/TRAINING PROGRAM

## 2021-12-01 ASSESSMENT — PAIN SCALES - GENERAL: PAINLEVEL: NO PAIN (0)

## 2021-12-01 NOTE — NURSING NOTE
"Chief Complaint   Patient presents with     Prenatal Care     30 week 4 days       Initial /70   Pulse 62   Wt 73.9 kg (163 lb)   LMP 04/25/2021 (Exact Date)   BMI 26.31 kg/m   Estimated body mass index is 26.31 kg/m  as calculated from the following:    Height as of 6/24/21: 1.676 m (5' 6\").    Weight as of this encounter: 73.9 kg (163 lb).  Medication Reconciliation: complete    FOOD SECURITY SCREENING QUESTIONS  Hunger Vital Signs:  Within the past 12 months we worried whether our food would run out before we got money to buy more. Never  Within the past 12 months the food we bought just didn't last and we didn't have money to get more. Never  Deepa Vaughn LPN 12/1/2021 11:37 AM             Deepa Vaughn LPN  "

## 2021-12-01 NOTE — PROGRESS NOTES
Return OB Visit    S: Ms. Mcqueen is feeling well today. She has no acute concerns. Denies leaking of fluid, vaginal bleeding, painful contractions. Notes fetal movements.    O: /70   Pulse 62   Wt 73.9 kg (163 lb)   LMP 2021 (Exact Date)   BMI 26.31 kg/m    Gen: Well-appearing, NAD    FH: 28 cm  FHT: 144 bpm    Assessment:  Ms. Rowena Mcqueen is a 30 year old yo  here for OB follow up. She is currently 27w5d. Her pregnancy is complicated by ovarian cyst (stable 7 cm, simple).     Plan:  # Routine Prenatal Care  -- Datinw5d ROBIN: 2022  -- PNLs:              O+, Ab screen neg              RPR nr              Hep B S Ag neg              Rubella immune              HIV neg              NIL pap     -- Genetic Screening: [place] low risk male,   -- Anatomy US: Normal anatomy from   -- Immunizations: Tdap   -- 3rd TM labs including CBC, RPR: Hgb 10.6, RPRnr  -- 1 hr GTT: 122  -- GBS: Planned for 36 weeks  -- Postpartum Planning: To be discussed   -- Delivery Planning: No indication for early IOL at this time. To be discussed continually  -- Return to clinic in 2 weeks for OB follow up visit  -- Planning to do at next visit:     # Left adnexal cyst   -- 7.5 cm left adnexal cyst, simple in appearance stable    -- US show stabilty       Elsa Rene MD  OB/GYN  2021 11:46 AM

## 2021-12-22 ENCOUNTER — PRENATAL OFFICE VISIT (OUTPATIENT)
Dept: OBGYN | Facility: OTHER | Age: 31
End: 2021-12-22
Attending: STUDENT IN AN ORGANIZED HEALTH CARE EDUCATION/TRAINING PROGRAM
Payer: COMMERCIAL

## 2021-12-22 VITALS
HEART RATE: 102 BPM | DIASTOLIC BLOOD PRESSURE: 62 MMHG | SYSTOLIC BLOOD PRESSURE: 108 MMHG | WEIGHT: 164.9 LBS | BODY MASS INDEX: 26.62 KG/M2

## 2021-12-22 DIAGNOSIS — N83.209 CYST OF OVARY, UNSPECIFIED LATERALITY: ICD-10-CM

## 2021-12-22 DIAGNOSIS — Z34.90 THIRD PREGNANCY: Primary | ICD-10-CM

## 2021-12-22 PROCEDURE — 99207 PR OB VISIT-NO CHARGE - GICH ONLY: CPT | Performed by: STUDENT IN AN ORGANIZED HEALTH CARE EDUCATION/TRAINING PROGRAM

## 2021-12-22 NOTE — PROGRESS NOTES
Return OB Visit    S: Ms. Mcqueen is feeling well today. She has no acute concerns. Denies leaking of fluid, vaginal bleeding, painful contractions. Notes fetal movements.    O:   /62   Pulse 102   Wt 74.8 kg (164 lb 14.4 oz)   LMP 2021 (Exact Date)   BMI 26.62 kg/m      Gen: Well-appearing, NAD    FH 32 cm   bpm    Assessment:  Ms. Rowena Mcqueen is a 30 year old yo  here for OB follow up. She is currently 33w4d. Her pregnancy is complicated by ovarian cyst (stable 7 cm, simple).     Plan:  # Routine Prenatal Care  -- Datinw5d ROBIN: 2022  -- PNLs:              O+, Ab screen neg              RPR nr              Hep B S Ag neg              Rubella immune              HIV neg              NIL pap     -- Genetic Screening: [place] low risk male,   -- Anatomy US: Normal anatomy from   -- Immunizations: Tdap   -- 3rd TM labs including CBC, RPR: Hgb 10.6, RPRnr  -- 1 hr GTT: 122  -- GBS: Planned for next visit  -- Postpartum Planning: To be discussed   -- Delivery Planning: No indication for early IOL at this time. To be discussed continually  -- Return to clinic in 2 weeks for OB follow up visit  -- Planning to do at next visit: follow up [facility] appt- she is planning to deliver there, GBS swab     # Left adnexal cyst   -- 7.5 cm left adnexal cyst, simple in appearance stable    -- US shows stabilty

## 2021-12-22 NOTE — NURSING NOTE
Pt presents to clinic today for prenatal care 33w4d. Pt denies any contractions, bleeding, or leakage of fluid at this time. States baby is moving good.      Medication Reconciliation: complete  Cori Thayer LPN

## 2021-12-25 ENCOUNTER — HOSPITAL ENCOUNTER (EMERGENCY)
Facility: OTHER | Age: 31
Discharge: HOME | End: 2021-12-25
Attending: EMERGENCY MEDICINE | Admitting: EMERGENCY MEDICINE
Payer: COMMERCIAL

## 2021-12-25 VITALS
HEART RATE: 103 BPM | RESPIRATION RATE: 16 BRPM | OXYGEN SATURATION: 98 % | TEMPERATURE: 98.7 F | BODY MASS INDEX: 26.84 KG/M2 | SYSTOLIC BLOOD PRESSURE: 103 MMHG | WEIGHT: 167 LBS | DIASTOLIC BLOOD PRESSURE: 61 MMHG | HEIGHT: 66 IN

## 2021-12-25 DIAGNOSIS — N39.0 URINARY TRACT INFECTION WITHOUT HEMATURIA, SITE UNSPECIFIED: Primary | ICD-10-CM

## 2021-12-25 LAB
ALBUMIN UR-MCNC: NEGATIVE MG/DL
ANION GAP SERPL CALCULATED.3IONS-SCNC: 8 MMOL/L (ref 3–14)
APPEARANCE UR: ABNORMAL
BACTERIA #/AREA URNS HPF: ABNORMAL /HPF
BASOPHILS # BLD AUTO: 0 10E3/UL (ref 0–0.2)
BASOPHILS NFR BLD AUTO: 0 %
BILIRUB UR QL STRIP: NEGATIVE
BUN SERPL-MCNC: 5 MG/DL (ref 7–25)
CALCIUM SERPL-MCNC: 8.9 MG/DL (ref 8.6–10.3)
CHLORIDE BLD-SCNC: 102 MMOL/L (ref 98–107)
CO2 SERPL-SCNC: 23 MMOL/L (ref 21–31)
COLOR UR AUTO: YELLOW
CREAT SERPL-MCNC: 0.52 MG/DL (ref 0.6–1.2)
EOSINOPHIL # BLD AUTO: 0 10E3/UL (ref 0–0.7)
EOSINOPHIL NFR BLD AUTO: 0 %
ERYTHROCYTE [DISTWIDTH] IN BLOOD BY AUTOMATED COUNT: 12 % (ref 10–15)
GFR SERPL CREATININE-BSD FRML MDRD: >90 ML/MIN/1.73M2
GLUCOSE BLD-MCNC: 84 MG/DL (ref 70–105)
GLUCOSE UR STRIP-MCNC: 500 MG/DL
HCT VFR BLD AUTO: 31 % (ref 35–47)
HGB BLD-MCNC: 10.7 G/DL (ref 11.7–15.7)
HGB UR QL STRIP: NEGATIVE
IMM GRANULOCYTES # BLD: 0 10E3/UL
IMM GRANULOCYTES NFR BLD: 1 %
KETONES UR STRIP-MCNC: 40 MG/DL
LEUKOCYTE ESTERASE UR QL STRIP: ABNORMAL
LYMPHOCYTES # BLD AUTO: 0.7 10E3/UL (ref 0.8–5.3)
LYMPHOCYTES NFR BLD AUTO: 12 %
MCH RBC QN AUTO: 30.2 PG (ref 26.5–33)
MCHC RBC AUTO-ENTMCNC: 34.5 G/DL (ref 31.5–36.5)
MCV RBC AUTO: 88 FL (ref 78–100)
MONOCYTES # BLD AUTO: 0.4 10E3/UL (ref 0–1.3)
MONOCYTES NFR BLD AUTO: 8 %
MUCOUS THREADS #/AREA URNS LPF: PRESENT /LPF
NEUTROPHILS # BLD AUTO: 4.3 10E3/UL (ref 1.6–8.3)
NEUTROPHILS NFR BLD AUTO: 79 %
NITRATE UR QL: NEGATIVE
NRBC # BLD AUTO: 0 10E3/UL
NRBC BLD AUTO-RTO: 0 /100
PH UR STRIP: 6 [PH] (ref 5–9)
PLATELET # BLD AUTO: 225 10E3/UL (ref 150–450)
POTASSIUM BLD-SCNC: 3.4 MMOL/L (ref 3.5–5.1)
RBC # BLD AUTO: 3.54 10E6/UL (ref 3.8–5.2)
RBC URINE: 4 /HPF
SODIUM SERPL-SCNC: 133 MMOL/L (ref 134–144)
SP GR UR STRIP: 1.01 (ref 1–1.03)
SQUAMOUS EPITHELIAL: 7 /HPF
UROBILINOGEN UR STRIP-MCNC: NORMAL MG/DL
WBC # BLD AUTO: 5.4 10E3/UL (ref 4–11)
WBC URINE: 43 /HPF

## 2021-12-25 PROCEDURE — 85025 COMPLETE CBC W/AUTO DIFF WBC: CPT | Performed by: EMERGENCY MEDICINE

## 2021-12-25 PROCEDURE — 36415 COLL VENOUS BLD VENIPUNCTURE: CPT | Performed by: EMERGENCY MEDICINE

## 2021-12-25 PROCEDURE — 99284 EMERGENCY DEPT VISIT MOD MDM: CPT | Mod: 25 | Performed by: EMERGENCY MEDICINE

## 2021-12-25 PROCEDURE — 81001 URINALYSIS AUTO W/SCOPE: CPT | Performed by: EMERGENCY MEDICINE

## 2021-12-25 PROCEDURE — 250N000013 HC RX MED GY IP 250 OP 250 PS 637: Performed by: EMERGENCY MEDICINE

## 2021-12-25 PROCEDURE — 96361 HYDRATE IV INFUSION ADD-ON: CPT | Performed by: EMERGENCY MEDICINE

## 2021-12-25 PROCEDURE — 99283 EMERGENCY DEPT VISIT LOW MDM: CPT | Performed by: EMERGENCY MEDICINE

## 2021-12-25 PROCEDURE — 96374 THER/PROPH/DIAG INJ IV PUSH: CPT | Performed by: EMERGENCY MEDICINE

## 2021-12-25 PROCEDURE — 87086 URINE CULTURE/COLONY COUNT: CPT | Performed by: EMERGENCY MEDICINE

## 2021-12-25 PROCEDURE — 80048 BASIC METABOLIC PNL TOTAL CA: CPT | Performed by: EMERGENCY MEDICINE

## 2021-12-25 PROCEDURE — 250N000011 HC RX IP 250 OP 636: Performed by: EMERGENCY MEDICINE

## 2021-12-25 PROCEDURE — 258N000003 HC RX IP 258 OP 636: Performed by: EMERGENCY MEDICINE

## 2021-12-25 RX ORDER — CEPHALEXIN 500 MG/1
500 CAPSULE ORAL 2 TIMES DAILY
Qty: 30 CAPSULE | Refills: 0 | Status: SHIPPED | OUTPATIENT
Start: 2021-12-25 | End: 2022-01-04

## 2021-12-25 RX ORDER — ONDANSETRON 2 MG/ML
4 INJECTION INTRAMUSCULAR; INTRAVENOUS ONCE
Status: COMPLETED | OUTPATIENT
Start: 2021-12-25 | End: 2021-12-25

## 2021-12-25 RX ADMIN — ONDANSETRON 4 MG: 2 INJECTION INTRAMUSCULAR; INTRAVENOUS at 13:28

## 2021-12-25 RX ADMIN — DEXTROSE AND SODIUM CHLORIDE 1000 ML: 5; 900 INJECTION, SOLUTION INTRAVENOUS at 13:31

## 2021-12-25 RX ADMIN — CEPHALEXIN 500 MG: 250 CAPSULE ORAL at 15:31

## 2021-12-25 ASSESSMENT — MIFFLIN-ST. JEOR: SCORE: 1494.26

## 2021-12-25 NOTE — ED PROVIDER NOTES
"Emergency Department Provider Note  : 1990 Age: 31 year old Sex: female MRN: [id number]    Chief Complaint   Patient presents with     Nausea & Vomiting     unable to eat       Medical Decision Making / Assessment / Plan   31 year old female presenting with nausea and vomiting in pregnany. Nontoxic, still feeling baby move, no vaginal discharge or bleeding. CBC and chem reassuring. No AG. She was given Zofran, IVF with improvement in symptoms. Urinalysis with infection, no ketones. Normal VS, afebrile. Discharge with Keflex. No reaction to keflex in ED.    The patient was informed of the plan and verbalized understanding and agreed with the plan. The patient was given strict return to Emergency Department precautions as well as appropriate follow up instructions. The patient was discharged in stable condition.    New Prescriptions    CEPHALEXIN (KEFLEX) 500 MG CAPSULE    Take 1 capsule (500 mg) by mouth 2 times daily       Final diagnoses:   Urinary tract infection without hematuria, site unspecified       David Whalen MD  2021   Emergency Department    Anna Marie Guaman is a 31 year old female who presents at 12:06 PM with nausea vomiting and diarrhea for the past few days without CP or SOB. No vaginal discharge or bleeding. She is 36 weeks pregnant with her third child. Pregnancy going well.     I have reviewed the Medications, Allergies, Past Medical and Surgical History, and Social History in the [facility] and with family.    Review of Systems:  Please see HPI for pertinent positives and negatives. All other systems reviewed and found to be negative.      Objective   BP: 110/64  Pulse: 107  Temp: 99.3  F (37.4  C)  Resp: 16  Height: 167.6 cm (5' 6\")  Weight: 75.8 kg (167 lb)  SpO2: 98 %    Physical Exam:   General: Awake, alert, in no acute distress.  Head: Normocephalic, atraumatic.  Eyes: Conjugate gaze.  ENT: Moist membranes, external ear appears normal.   Chest/Respiratory: Equal " chest rise.  Cardiovascular: Peripheral pulses present.  Abdominal: Soft, non-distended, non-tender, gravid.  Extremities: No obvious deformity.  Neurological: GCS 15, moving all extremities without gross deficit.  Skin: Warm, no rashes, lesions, or bruising.   Psychiatric: Appropriate affect.     Procedures / Critical Care   Procedures    Critical Care Time: None.          Medical/Surgical History:  Past Medical History:   Diagnosis Date     Contact dermatitis and eczema 5/21/2021    Formatting of this note might be different from the original. Scalp dermatitis, dermatological consult pending.     History of other genital system and obstetric disorders     Premature at 37 weeks with birth weight 5 lbs 15 oz requiring oxygen first 36 hours of life.     Past Surgical History:   Procedure Laterality Date     WISDOM TOOTH EXTRACTION         Medications:  No current facility-administered medications for this encounter.     Current Outpatient Medications   Medication     cephALEXin (KEFLEX) 500 MG capsule     Cholecalciferol (VITAMIN D3) 50 MCG (2000 UT) CAPS     cyanocobalamin (VITAMIN B-12) 1000 MCG tablet     Prenatal Vit-Fe Fumarate-FA (PRENATAL MULTIVITAMIN  PLUS IRON) 27-1 MG TABS       Allergies:  Food, Seasonal allergies, and Penicillins    Relevant labs, images, EKGs, Epic and outside hospital (if applicable) charts were reviewed. The findings, diagnosis, plan, and need for follow up were discussed with the patient/family. Nursing notes were reviewed.      David Whalen MD  12/25/21 1600

## 2021-12-25 NOTE — ED TRIAGE NOTES
"ED Nursing Triage Note (General)   ________________________________    Rowena ZAHEER Mcqueen is a 31 year old Female that presents to triage private car  With history of  34 weeks pregnant and having new nausea and vomiting with inability to keep food or fluids down reported by patient   Significant symptoms had onset 4 day(s) ago.  /64   Pulse 107   Temp 99.3  F (37.4  C) (Tympanic)   Resp 16   Ht 1.676 m (5' 6\")   Wt 75.8 kg (167 lb)   LMP 04/25/2021 (Exact Date)   SpO2 98%   Breastfeeding No   BMI 26.95 kg/m  t  Patient appears alert , in mild distress., and cooperative behavior.    GCS Total = 15  Airway: intact  Breathing noted as Normal  Circulation Normal  Skin:  Normal  Action taken:  To room      PRE [facility]PITAL PRIOR LIVING SITUATION Spouse and Children  "

## 2021-12-28 ENCOUNTER — MYC MEDICAL ADVICE (OUTPATIENT)
Dept: OBGYN | Facility: OTHER | Age: 31
End: 2021-12-28
Payer: COMMERCIAL

## 2021-12-28 LAB — BACTERIA UR CULT: NORMAL

## 2021-12-31 ENCOUNTER — PRENATAL OFFICE VISIT (OUTPATIENT)
Dept: OBGYN | Facility: OTHER | Age: 31
End: 2021-12-31
Attending: STUDENT IN AN ORGANIZED HEALTH CARE EDUCATION/TRAINING PROGRAM
Payer: COMMERCIAL

## 2021-12-31 VITALS
BODY MASS INDEX: 26.26 KG/M2 | HEART RATE: 84 BPM | DIASTOLIC BLOOD PRESSURE: 64 MMHG | OXYGEN SATURATION: 100 % | HEIGHT: 66 IN | SYSTOLIC BLOOD PRESSURE: 108 MMHG | WEIGHT: 163.4 LBS

## 2021-12-31 DIAGNOSIS — Z34.83 ENCOUNTER FOR SUPERVISION OF OTHER NORMAL PREGNANCY IN THIRD TRIMESTER: Primary | ICD-10-CM

## 2021-12-31 PROCEDURE — 99207 PR OB VISIT-NO CHARGE - GICH ONLY: CPT | Performed by: OBSTETRICS & GYNECOLOGY

## 2021-12-31 ASSESSMENT — PAIN SCALES - GENERAL: PAINLEVEL: NO PAIN (0)

## 2021-12-31 ASSESSMENT — MIFFLIN-ST. JEOR: SCORE: 1477.93

## 2021-12-31 NOTE — PROGRESS NOTES
"Return OB Visit    S: Patient is feeling well. No ctx, VB or LOF. +FM    O: /64   Pulse 84   Ht 1.676 m (5' 6\")   Wt 74.1 kg (163 lb 6.4 oz)   LMP 2021 (Exact Date)   SpO2 100%   BMI 26.37 kg/m    Gen: Well-appearing, NAD  See OB Flowsheet    A/P:  Rowena Mcqueen is a 31 year old  at 34w6d by 7w5d US, here for return OB visit.  Plans to deliver at [facility] for NICU availability, has consult 1/  Left adnexal cyst: 7.5cm, stable  Planning vasectomy vs tubal    PNC: Rh positive, Rubella immune,   Genetics: low-risk harmony  Imaging: dating US at 7w5d, normal anatomy  Immunizations: s/p COVID series  RTC 2 weeks    Juliana Anderson MD FACOG  OB/GYN  2021 2:16 PM     "

## 2021-12-31 NOTE — NURSING NOTE
Patient presents today for 34w6d prenatal check up.    Medication Reconciliation Complete    Antonia Gomez LPN  12/31/2021 2:04 PM

## 2022-01-03 ENCOUNTER — TELEPHONE (OUTPATIENT)
Dept: OBGYN | Facility: CLINIC | Age: 32
End: 2022-01-03
Payer: COMMERCIAL

## 2022-01-03 NOTE — TELEPHONE ENCOUNTER
Pt was scheduled with the incorrect provider.  Pt is a [place].  This writer scheduled as a nurse visit for possible 1:40 appt with afternoon clinic with Mary Mendes.

## 2022-01-04 ENCOUNTER — PRENATAL OFFICE VISIT (OUTPATIENT)
Dept: OBGYN | Facility: OTHER | Age: 32
End: 2022-01-04
Attending: OBSTETRICS & GYNECOLOGY
Payer: COMMERCIAL

## 2022-01-04 ENCOUNTER — HOSPITAL ENCOUNTER (OUTPATIENT)
Facility: OTHER | Age: 32
Discharge: HOME | End: 2022-01-04
Attending: OBSTETRICS & GYNECOLOGY | Admitting: OBSTETRICS & GYNECOLOGY
Payer: COMMERCIAL

## 2022-01-04 ENCOUNTER — HOSPITAL ENCOUNTER (OUTPATIENT)
Facility: OTHER | Age: 32
End: 2022-01-04
Admitting: OBSTETRICS & GYNECOLOGY
Payer: COMMERCIAL

## 2022-01-04 VITALS
SYSTOLIC BLOOD PRESSURE: 110 MMHG | HEART RATE: 104 BPM | BODY MASS INDEX: 26.02 KG/M2 | DIASTOLIC BLOOD PRESSURE: 62 MMHG | TEMPERATURE: 98.8 F | WEIGHT: 161.2 LBS

## 2022-01-04 VITALS
SYSTOLIC BLOOD PRESSURE: 102 MMHG | HEART RATE: 91 BPM | WEIGHT: 161 LBS | OXYGEN SATURATION: 97 % | RESPIRATION RATE: 16 BRPM | BODY MASS INDEX: 25.27 KG/M2 | HEIGHT: 67 IN | TEMPERATURE: 98.8 F | DIASTOLIC BLOOD PRESSURE: 62 MMHG

## 2022-01-04 DIAGNOSIS — Z34.93 ENCOUNTER FOR PREGNANCY RELATED EXAMINATION IN THIRD TRIMESTER: Primary | ICD-10-CM

## 2022-01-04 DIAGNOSIS — Z87.440 PERSONAL HISTORY OF URINARY TRACT INFECTION: ICD-10-CM

## 2022-01-04 DIAGNOSIS — N10 PYELONEPHRITIS, ACUTE: Primary | ICD-10-CM

## 2022-01-04 DIAGNOSIS — O21.9 NAUSEA AND VOMITING IN PREGNANCY: ICD-10-CM

## 2022-01-04 PROBLEM — Z36.89 ENCOUNTER FOR TRIAGE IN PREGNANT PATIENT: Status: ACTIVE | Noted: 2022-01-04

## 2022-01-04 LAB
ALBUMIN SERPL-MCNC: 3.6 G/DL (ref 3.5–5.7)
ALBUMIN UR-MCNC: 20 MG/DL
ALP SERPL-CCNC: 119 U/L (ref 34–104)
ALT SERPL W P-5'-P-CCNC: 47 U/L (ref 7–52)
ANION GAP SERPL CALCULATED.3IONS-SCNC: 9 MMOL/L (ref 3–14)
APPEARANCE UR: ABNORMAL
AST SERPL W P-5'-P-CCNC: 23 U/L (ref 13–39)
BACTERIA #/AREA URNS HPF: ABNORMAL /HPF
BILIRUB SERPL-MCNC: 0.8 MG/DL (ref 0.3–1)
BILIRUB UR QL STRIP: NEGATIVE
BUN SERPL-MCNC: 7 MG/DL (ref 7–25)
CALCIUM SERPL-MCNC: 8.9 MG/DL (ref 8.6–10.3)
CHLORIDE BLD-SCNC: 102 MMOL/L (ref 98–107)
CO2 SERPL-SCNC: 23 MMOL/L (ref 21–31)
COLOR UR AUTO: YELLOW
CREAT SERPL-MCNC: 0.52 MG/DL (ref 0.6–1.2)
ERYTHROCYTE [DISTWIDTH] IN BLOOD BY AUTOMATED COUNT: 11.9 % (ref 10–15)
FLUAV RNA SPEC QL NAA+PROBE: NEGATIVE
FLUBV RNA RESP QL NAA+PROBE: NEGATIVE
GFR SERPL CREATININE-BSD FRML MDRD: >90 ML/MIN/1.73M2
GLUCOSE BLD-MCNC: 76 MG/DL (ref 70–105)
GLUCOSE UR STRIP-MCNC: NEGATIVE MG/DL
HCT VFR BLD AUTO: 32 % (ref 35–47)
HGB BLD-MCNC: 11 G/DL (ref 11.7–15.7)
HGB UR QL STRIP: NEGATIVE
KETONES UR STRIP-MCNC: 100 MG/DL
LEUKOCYTE ESTERASE UR QL STRIP: ABNORMAL
MCH RBC QN AUTO: 29.6 PG (ref 26.5–33)
MCHC RBC AUTO-ENTMCNC: 34.4 G/DL (ref 31.5–36.5)
MCV RBC AUTO: 86 FL (ref 78–100)
MUCOUS THREADS #/AREA URNS LPF: PRESENT /LPF
NITRATE UR QL: NEGATIVE
PH UR STRIP: 6.5 [PH] (ref 5–9)
PLATELET # BLD AUTO: 244 10E3/UL (ref 150–450)
POTASSIUM BLD-SCNC: 3.6 MMOL/L (ref 3.5–5.1)
PROT SERPL-MCNC: 6.6 G/DL (ref 6.4–8.9)
RBC # BLD AUTO: 3.71 10E6/UL (ref 3.8–5.2)
RBC URINE: 1 /HPF
RSV RNA SPEC NAA+PROBE: NEGATIVE
SARS-COV-2 RNA RESP QL NAA+PROBE: NEGATIVE
SODIUM SERPL-SCNC: 134 MMOL/L (ref 134–144)
SP GR UR STRIP: 1.02 (ref 1–1.03)
SQUAMOUS EPITHELIAL: 25 /HPF
UROBILINOGEN UR STRIP-MCNC: NORMAL MG/DL
WBC # BLD AUTO: 6.3 10E3/UL (ref 4–11)
WBC URINE: 64 /HPF

## 2022-01-04 PROCEDURE — 96365 THER/PROPH/DIAG IV INF INIT: CPT

## 2022-01-04 PROCEDURE — 36415 COLL VENOUS BLD VENIPUNCTURE: CPT | Mod: ZL | Performed by: OBSTETRICS & GYNECOLOGY

## 2022-01-04 PROCEDURE — 80053 COMPREHEN METABOLIC PANEL: CPT | Mod: ZL | Performed by: OBSTETRICS & GYNECOLOGY

## 2022-01-04 PROCEDURE — 85027 COMPLETE CBC AUTOMATED: CPT | Mod: ZL | Performed by: OBSTETRICS & GYNECOLOGY

## 2022-01-04 PROCEDURE — 96375 TX/PRO/DX INJ NEW DRUG ADDON: CPT

## 2022-01-04 PROCEDURE — 87637 SARSCOV2&INF A&B&RSV AMP PRB: CPT | Mod: ZL | Performed by: OBSTETRICS & GYNECOLOGY

## 2022-01-04 PROCEDURE — 81001 URINALYSIS AUTO W/SCOPE: CPT | Mod: ZL | Performed by: OBSTETRICS & GYNECOLOGY

## 2022-01-04 PROCEDURE — 96374 THER/PROPH/DIAG INJ IV PUSH: CPT

## 2022-01-04 PROCEDURE — 250N000011 HC RX IP 250 OP 636: Performed by: OBSTETRICS & GYNECOLOGY

## 2022-01-04 PROCEDURE — 96360 HYDRATION IV INFUSION INIT: CPT

## 2022-01-04 PROCEDURE — 99207 PR OB VISIT-NO CHARGE - GICH ONLY: CPT | Performed by: OBSTETRICS & GYNECOLOGY

## 2022-01-04 PROCEDURE — C9803 HOPD COVID-19 SPEC COLLECT: HCPCS | Performed by: OBSTETRICS & GYNECOLOGY

## 2022-01-04 PROCEDURE — 87086 URINE CULTURE/COLONY COUNT: CPT | Mod: ZL | Performed by: OBSTETRICS & GYNECOLOGY

## 2022-01-04 PROCEDURE — 96374 THER/PROPH/DIAG INJ IV PUSH: CPT | Mod: XU

## 2022-01-04 PROCEDURE — 258N000003 HC RX IP 258 OP 636: Performed by: OBSTETRICS & GYNECOLOGY

## 2022-01-04 RX ORDER — ONDANSETRON 4 MG/1
4 TABLET, ORALLY DISINTEGRATING ORAL EVERY 8 HOURS PRN
Qty: 20 TABLET | Refills: 0 | Status: SHIPPED | OUTPATIENT
Start: 2022-01-04 | End: 2022-01-20

## 2022-01-04 RX ORDER — LIDOCAINE 40 MG/G
CREAM TOPICAL
Status: DISCONTINUED | OUTPATIENT
Start: 2022-01-04 | End: 2022-01-04 | Stop reason: HOSPADM

## 2022-01-04 RX ORDER — SULFAMETHOXAZOLE/TRIMETHOPRIM 800-160 MG
1 TABLET ORAL 2 TIMES DAILY
Qty: 28 TABLET | Refills: 0 | Status: SHIPPED | OUTPATIENT
Start: 2022-01-04 | End: 2022-01-18

## 2022-01-04 RX ORDER — LIDOCAINE 40 MG/G
CREAM TOPICAL
Status: CANCELLED | OUTPATIENT
Start: 2022-01-04

## 2022-01-04 RX ORDER — ONDANSETRON 2 MG/ML
4 INJECTION INTRAMUSCULAR; INTRAVENOUS EVERY 6 HOURS PRN
Status: CANCELLED | OUTPATIENT
Start: 2022-01-04

## 2022-01-04 RX ORDER — CEFTRIAXONE SODIUM 1 G/50ML
1 INJECTION, SOLUTION INTRAVENOUS EVERY 24 HOURS
Status: DISCONTINUED | OUTPATIENT
Start: 2022-01-04 | End: 2022-01-04 | Stop reason: HOSPADM

## 2022-01-04 RX ORDER — ONDANSETRON 2 MG/ML
4 INJECTION INTRAMUSCULAR; INTRAVENOUS EVERY 6 HOURS PRN
Status: DISCONTINUED | OUTPATIENT
Start: 2022-01-04 | End: 2022-01-04 | Stop reason: HOSPADM

## 2022-01-04 RX ADMIN — ONDANSETRON 4 MG: 2 INJECTION INTRAMUSCULAR; INTRAVENOUS at 15:00

## 2022-01-04 RX ADMIN — SODIUM CHLORIDE, POTASSIUM CHLORIDE, SODIUM LACTATE AND CALCIUM CHLORIDE 1000 ML: 600; 310; 30; 20 INJECTION, SOLUTION INTRAVENOUS at 14:57

## 2022-01-04 RX ADMIN — CEFTRIAXONE SODIUM 1 G: 1 INJECTION, SOLUTION INTRAVENOUS at 15:26

## 2022-01-04 ASSESSMENT — MIFFLIN-ST. JEOR: SCORE: 1474.98

## 2022-01-04 NOTE — NURSING NOTE
Pt presents to clinic today for prenatal care 35w3d. Pt has been struggling with Nausea and diarrhea.  Pt had recent UTI and finished Keflex on January 1st.      Medication Reconciliation: complete  Cori Thayer LPN

## 2022-01-04 NOTE — PROGRESS NOTES
CC: Recheck OB visit at 35w3d    HPI: Rowena Mcqueen presents for a routine OB visit now at 35w3d  Concerns: Kylah presented to ED on [place] [facility] and had symptoms at that time of UTI. She was also having nausea and vomiting. Workup showed possible UTI and was started on Keflex. She completed treatment two days ago, and since last night has been having some abdominal pain, and vomiting again now. Pregnancy previous to that was uncomplicated.    Patient notices normal fetal movement, denies contractions, vaginal bleeding or leaking of fluid.    OB History    Para Term  AB Living   4 2 2 0 1 2   SAB IAB Ectopic Multiple Live Births   1 0 0 0 0      # Outcome Date GA Lbr Red/2nd Weight Sex Delivery Anes PTL Lv   4 Current            3 Term 18    M       2 Term 05/21/15    M       1 SAB      AB, MISSED        Current Outpatient Medications   Medication     Cholecalciferol (VITAMIN D3) 50 MCG (2000 UT) CAPS     cyanocobalamin (VITAMIN B-12) 1000 MCG tablet     Prenatal Vit-Fe Fumarate-FA (PRENATAL MULTIVITAMIN  PLUS IRON) 27-1 MG TABS     No current facility-administered medications for this visit.     O: /62   Pulse 104   Temp 98.8  F (37.1  C)   Wt 73.1 kg (161 lb 3.2 oz)   LMP 2021 (Exact Date)   BMI 26.02 kg/m    Body mass index is 26.02 kg/m .  See OB flow sheet  EXAM:  NAD  FH:35 cm  FHT: 180 bpm  Cephalic baby  Tender to percussion of right flank.    No results found for any visits on 22.    A/P: (Z34.93) Encounter for pregnancy related examination in third trimester  (primary encounter diagnosis)  Comment:     (Z34.93) Encounter for pregnancy related examination in third trimester  (primary encounter diagnosis)  Comment:   Plan: UA with Microscopic reflex to Culture, Urine         Culture, CANCELED: UA with Microscopic reflex         to Culture            (O21.9) Nausea and vomiting in pregnancy  Comment:   Plan: Symptomatic; Yes Influenza A/B & SARS-CoV2          (COVID-19) Virus PCR Multiplex            (Z87.440) Personal history of urinary tract infection  Comment:   Plan: CBC W PLT No Diff, Comprehensive Metabolic         Panel          Sent to [facility] for IV fluids, Zofran.  If CBC elevated would treat with IV antibiotics for upper UT infection    Delbert Storey MD FACOG  2:08 PM 1/4/2022

## 2022-01-04 NOTE — DISCHARGE INSTRUCTIONS
[facility] [phone]    Contact Physician for questions and or concerns.     Return to [facility] for following-headaches, blurred vision, abdominal pain, uterine contractions, leaking amniotic fluid, vaginal bleeding.

## 2022-01-04 NOTE — TELEPHONE ENCOUNTER
Call received from patient stating that her symptoms are back and just not feeling well and not able to keep anything down. States she finished her UTI antibiotics on Sat. She thinks it may be back and is requesting zofran. Pt. Aware Dr. Hewitt is out of clinic this week. Patient is ok with seeing Dr. Storey today for further assess for UTI and Rx for Zofran if appropriate. Patient put in for 2pm today. No further questions at this time.   Noemi Anderson RN on 1/4/2022 at 1:21 PM

## 2022-01-04 NOTE — PROGRESS NOTES
Patient feeling much better after receiving IV fluids, Zofran and IV antibiotics. FTH-150 category one tracing, having occasional uterine contraction that is with out pain. Cordelia WAN here to see patient and discharge instructions given.

## 2022-01-06 LAB — BACTERIA UR CULT: NORMAL

## 2022-01-11 ENCOUNTER — MYC MEDICAL ADVICE (OUTPATIENT)
Dept: OBGYN | Facility: OTHER | Age: 32
End: 2022-01-11
Payer: COMMERCIAL

## 2022-01-13 ENCOUNTER — PRENATAL OFFICE VISIT (OUTPATIENT)
Dept: OBGYN | Facility: OTHER | Age: 32
End: 2022-01-13
Attending: STUDENT IN AN ORGANIZED HEALTH CARE EDUCATION/TRAINING PROGRAM
Payer: COMMERCIAL

## 2022-01-13 VITALS
SYSTOLIC BLOOD PRESSURE: 105 MMHG | BODY MASS INDEX: 25.44 KG/M2 | WEIGHT: 160 LBS | DIASTOLIC BLOOD PRESSURE: 60 MMHG | HEART RATE: 80 BPM

## 2022-01-13 DIAGNOSIS — Z34.93 ENCOUNTER FOR PREGNANCY RELATED EXAMINATION IN THIRD TRIMESTER: Primary | ICD-10-CM

## 2022-01-13 DIAGNOSIS — Z87.440 PERSONAL HISTORY OF URINARY TRACT INFECTION: Primary | ICD-10-CM

## 2022-01-13 PROCEDURE — 99207 PR OB VISIT-NO CHARGE - GICH ONLY: CPT | Performed by: STUDENT IN AN ORGANIZED HEALTH CARE EDUCATION/TRAINING PROGRAM

## 2022-01-13 PROCEDURE — 87653 STREP B DNA AMP PROBE: CPT | Mod: ZL | Performed by: STUDENT IN AN ORGANIZED HEALTH CARE EDUCATION/TRAINING PROGRAM

## 2022-01-13 RX ORDER — CEPHALEXIN 500 MG/1
500 CAPSULE ORAL DAILY
Qty: 30 CAPSULE | Refills: 0 | Status: ON HOLD | OUTPATIENT
Start: 2022-01-13 | End: 2022-02-02

## 2022-01-13 ASSESSMENT — PAIN SCALES - GENERAL: PAINLEVEL: NO PAIN (0)

## 2022-01-13 NOTE — NURSING NOTE
"Chief Complaint   Patient presents with     Prenatal Care     36 weeks 5 days       Initial /60   Pulse 80   Wt 72.6 kg (160 lb)   LMP 04/25/2021 (Exact Date)   BMI 25.44 kg/m   Estimated body mass index is 25.44 kg/m  as calculated from the following:    Height as of 1/4/22: 1.689 m (5' 6.5\").    Weight as of this encounter: 72.6 kg (160 lb).  Medication Reconciliation: complete    FOOD SECURITY SCREENING QUESTIONS  Hunger Vital Signs:  Within the past 12 months we worried whether our food would run out before we got money to buy more. Never  Within the past 12 months the food we bought just didn't last and we didn't have money to get more. Never  Deepa Vaughn LPN 1/13/2022 10:11 AM             Deepa Vaughn LPN  "

## 2022-01-13 NOTE — PROGRESS NOTES
Return OB Visit    S: Ms. Mcqueen is feeling well today. She has no acute concerns. Denies leaking of fluid, vaginal bleeding, painful contractions. Notes fetal movements.    O: /60   Pulse 80   Wt 72.6 kg (160 lb)   LMP 2021 (Exact Date)   BMI 25.44 kg/m    Gen: Well-appearing, NAD    FH 36 cm   bpm    Assessment:  Ms. Rowena Mcqueen is a 30 year old yo  here for OB follow up. She is currently 36w5d. Her pregnancy is complicated by ovarian cyst (stable 7 cm, simple), pyelonephritis now resolved.     Plan:  # Routine Prenatal Care  -- Datinw5d ROBIN: 2022  -- PNLs:              O+, Ab screen neg              RPR nr              Hep B S Ag neg              Rubella immune              HIV neg              NIL pap     -- Genetic Screening: [place] low risk male,   -- Anatomy US: Normal anatomy from   -- Immunizations: Tdap   -- 3rd TM labs including CBC, RPR: Hgb 10.6, RPRnr  -- 1 hr GTT: 122  -- GBS: Today  -- Postpartum Planning: To be discussed   -- Delivery Planning: No indication for early IOL at this time. To be discussed continually  -- Return to clinic in 1 week for OB follow up visit  -- Planning to do at next visit: schedule IOL, SVE     # Left adnexal cyst   -- 7.5 cm left adnexal cyst, simple in appearance stable    -- US shows stabilty    Elsa Rene MD  OB/GYN  2022 10:24 AM

## 2022-01-14 LAB
GP B STREP DNA SPEC QL NAA+PROBE: NEGATIVE
PATIENT PENICILLIN, AMOXICILLIN, CEPHALOSPORINS ALLERGY: YES

## 2022-01-20 ENCOUNTER — PRENATAL OFFICE VISIT (OUTPATIENT)
Dept: OBGYN | Facility: OTHER | Age: 32
End: 2022-01-20
Attending: STUDENT IN AN ORGANIZED HEALTH CARE EDUCATION/TRAINING PROGRAM
Payer: COMMERCIAL

## 2022-01-20 VITALS
HEART RATE: 78 BPM | DIASTOLIC BLOOD PRESSURE: 74 MMHG | BODY MASS INDEX: 25.53 KG/M2 | SYSTOLIC BLOOD PRESSURE: 122 MMHG | WEIGHT: 160.6 LBS

## 2022-01-20 DIAGNOSIS — N83.201 CYST OF RIGHT OVARY: Primary | ICD-10-CM

## 2022-01-20 DIAGNOSIS — Z34.90 THIRD PREGNANCY: ICD-10-CM

## 2022-01-20 DIAGNOSIS — Z34.93 ENCOUNTER FOR PREGNANCY RELATED EXAMINATION IN THIRD TRIMESTER: ICD-10-CM

## 2022-01-20 PROCEDURE — 99207 PR OB VISIT-NO CHARGE - GICH ONLY: CPT | Performed by: STUDENT IN AN ORGANIZED HEALTH CARE EDUCATION/TRAINING PROGRAM

## 2022-01-20 NOTE — PROGRESS NOTES
Return OB Visit    S: Ms. Mcqueen is feeling well today. She has no acute concerns. Denies leaking of fluid, vaginal bleeding, painful contractions. Notes fetal movements    O: /74   Pulse 78   Wt 72.8 kg (160 lb 9.6 oz)   LMP 2021 (Exact Date)   BMI 25.53 kg/m    Gen: Well-appearing, NAD    FH 38 cm   bpm    Assessment:  Ms. Rowena Mcqueen is a 30 year old yo  here for OB follow up. She is currently 37w5d. Her pregnancy is complicated by ovarian cyst (stable 7 cm, simple), pyelonephritis now resolved.     Plan:  # Routine Prenatal Care  -- Datinw5d ROBIN: 2022  -- PNLs:              O+, Ab screen neg              RPR nr              Hep B S Ag neg              Rubella immune              HIV neg              NIL pap     -- Genetic Screening: [place] low risk male,   -- Anatomy US: Normal anatomy from   -- Immunizations: Tdap   -- 3rd TM labs including CBC, RPR: Hgb 10.6, RPRnr  -- 1 hr GTT: 122  -- GBS: Negative  -- Postpartum Planning: Breastfeeding  -- Delivery Planning: eIOL at 39 weeks: 5 am on   -- Return to clinic in 1 week for OB follow up visit  -- Planning to do at next visit: confirm IOL, SVE     # Left adnexal cyst   -- 7.5 cm left adnexal cyst, simple in appearance stable    -- US shows stability    # Pyelonephritis in pregnancy  -- on suppressive keflex    Elsa Rene MD  OB/GYN  2022 9:01 AM

## 2022-01-20 NOTE — NURSING NOTE
Pt presents to clinic today for prenatal care 37w5d.Pt denies any contractions, bleeding, or leakage of fluid at this time. Pt states baby is moving good.      Medication Reconciliation: complete  Cori Thayer LPN

## 2022-01-25 ENCOUNTER — ALLIED HEALTH/NURSE VISIT (OUTPATIENT)
Dept: FAMILY MEDICINE | Facility: OTHER | Age: 32
End: 2022-01-25
Attending: FAMILY MEDICINE
Payer: COMMERCIAL

## 2022-01-25 DIAGNOSIS — Z20.822 COVID-19 RULED OUT: Primary | ICD-10-CM

## 2022-01-25 PROCEDURE — C9803 HOPD COVID-19 SPEC COLLECT: HCPCS

## 2022-01-25 PROCEDURE — U0003 INFECTIOUS AGENT DETECTION BY NUCLEIC ACID (DNA OR RNA); SEVERE ACUTE RESPIRATORY SYNDROME CORONAVIRUS 2 (SARS-COV-2) (CORONAVIRUS DISEASE [COVID-19]), AMPLIFIED PROBE TECHNIQUE, MAKING USE OF HIGH THROUGHPUT TECHNOLOGIES AS DESCRIBED BY CMS-2020-01-R: HCPCS | Mod: ZL

## 2022-01-25 NOTE — PROGRESS NOTES
Patient here for Covid Testing. Pre op 1/29/22 [facility]. Patient said she had an positive home test about 2 weeks prior to this test today, no symptoms currently.    Amy Edgar MA on 1/25/2022 at 9:02 AM

## 2022-01-27 ENCOUNTER — PRENATAL OFFICE VISIT (OUTPATIENT)
Dept: OBGYN | Facility: OTHER | Age: 32
End: 2022-01-27
Attending: STUDENT IN AN ORGANIZED HEALTH CARE EDUCATION/TRAINING PROGRAM
Payer: COMMERCIAL

## 2022-01-27 VITALS
HEART RATE: 88 BPM | WEIGHT: 165.9 LBS | DIASTOLIC BLOOD PRESSURE: 62 MMHG | SYSTOLIC BLOOD PRESSURE: 120 MMHG | BODY MASS INDEX: 26.38 KG/M2

## 2022-01-27 DIAGNOSIS — Z34.90 THIRD PREGNANCY: Primary | ICD-10-CM

## 2022-01-27 DIAGNOSIS — Z34.93 THIRD TRIMESTER PREGNANCY: ICD-10-CM

## 2022-01-27 DIAGNOSIS — N94.9 ADNEXAL CYST: ICD-10-CM

## 2022-01-27 LAB — SARS-COV-2 RNA RESP QL NAA+PROBE: NEGATIVE

## 2022-01-27 PROCEDURE — 99207 PR OB VISIT-NO CHARGE - GICH ONLY: CPT | Performed by: STUDENT IN AN ORGANIZED HEALTH CARE EDUCATION/TRAINING PROGRAM

## 2022-01-27 NOTE — PROGRESS NOTES
Return OB Visit    S: Ms. Mcqueen is feeling well today. She has no acute concerns. Denies leaking of fluid, vaginal bleeding, painful contractions. Notes fetal movements.     eIOL planned for  when she is 39 weeks.    O:   /62   Pulse 88   Wt 75.3 kg (165 lb 14.4 oz)   LMP 2021 (Exact Date)   BMI 26.38 kg/m    Gen: Well-appearing, NAD    FH 38 cm   bpm      Assessment:  Ms. Rowena Mcqueen is a 30 year old yo  here for OB follow up. She is currently 38w5d. Her pregnancy is complicated by ovarian cyst (stable 7 cm, simple), pyelonephritis now resolved.     Plan:  # Routine Prenatal Care  -- Datinw5d ROBIN: 2022  -- PNLs:              O+, Ab screen neg              RPR nr              Hep B S Ag neg              Rubella immune              HIV neg              NIL pap     -- Genetic Screening: [place] low risk male,   -- Anatomy US: Normal anatomy from   -- Immunizations: Tdap   -- 3rd TM labs including CBC, RPR: Hgb 10.6, RPRnr  -- 1 hr GTT: 122  -- GBS: Negative  -- Postpartum Planning: Breastfeeding  -- Delivery Planning: eIOL at 39 weeks: 5 am on      # Left adnexal cyst   -- 7.5 cm left adnexal cyst, simple in appearance stable    -- US shows stability     # Pyelonephritis in pregnancy  -- on suppressive keflex       Elsa Rene MD  OB/GYN  2022 12:29 PM

## 2022-01-27 NOTE — NURSING NOTE
Pt presents to clinic today for prenatal care 38w5d. Pt denies any bleeding, or leakage of fluid at this time. States moving good and has noticed contractions.      Medication Reconciliation: complete  Cori Thayer LPN

## 2022-02-01 ENCOUNTER — ANESTHESIA EVENT (OUTPATIENT)
Dept: OBGYN | Facility: OTHER | Age: 32
End: 2022-02-01
Payer: COMMERCIAL

## 2022-02-01 ENCOUNTER — HOSPITAL ENCOUNTER (INPATIENT)
Facility: OTHER | Age: 32
LOS: 1 days | Discharge: HOME | End: 2022-02-02
Attending: STUDENT IN AN ORGANIZED HEALTH CARE EDUCATION/TRAINING PROGRAM | Admitting: STUDENT IN AN ORGANIZED HEALTH CARE EDUCATION/TRAINING PROGRAM
Payer: COMMERCIAL

## 2022-02-01 ENCOUNTER — ANESTHESIA (OUTPATIENT)
Dept: OBGYN | Facility: OTHER | Age: 32
End: 2022-02-01
Payer: COMMERCIAL

## 2022-02-01 DIAGNOSIS — Z34.90 ENCOUNTER FOR PLANNED INDUCTION OF LABOR: Primary | ICD-10-CM

## 2022-02-01 LAB
ABO/RH(D): NORMAL
ANTIBODY SCREEN: NEGATIVE
BASOPHILS # BLD AUTO: 0 10E3/UL (ref 0–0.2)
BASOPHILS NFR BLD AUTO: 0 %
EOSINOPHIL # BLD AUTO: 0 10E3/UL (ref 0–0.7)
EOSINOPHIL NFR BLD AUTO: 1 %
ERYTHROCYTE [DISTWIDTH] IN BLOOD BY AUTOMATED COUNT: 12.9 % (ref 10–15)
HCT VFR BLD AUTO: 30.7 % (ref 35–47)
HGB BLD-MCNC: 10.3 G/DL (ref 11.7–15.7)
IMM GRANULOCYTES # BLD: 0.1 10E3/UL
IMM GRANULOCYTES NFR BLD: 1 %
LYMPHOCYTES # BLD AUTO: 1.7 10E3/UL (ref 0.8–5.3)
LYMPHOCYTES NFR BLD AUTO: 27 %
MCH RBC QN AUTO: 29.4 PG (ref 26.5–33)
MCHC RBC AUTO-ENTMCNC: 33.6 G/DL (ref 31.5–36.5)
MCV RBC AUTO: 88 FL (ref 78–100)
MONOCYTES # BLD AUTO: 0.6 10E3/UL (ref 0–1.3)
MONOCYTES NFR BLD AUTO: 9 %
NEUTROPHILS # BLD AUTO: 4 10E3/UL (ref 1.6–8.3)
NEUTROPHILS NFR BLD AUTO: 62 %
NRBC # BLD AUTO: 0 10E3/UL
NRBC BLD AUTO-RTO: 0 /100
PLATELET # BLD AUTO: 240 10E3/UL (ref 150–450)
RBC # BLD AUTO: 3.5 10E6/UL (ref 3.8–5.2)
SARS-COV-2 RNA RESP QL NAA+PROBE: NEGATIVE
SPECIMEN EXPIRATION DATE: NORMAL
T PALLIDUM AB SER QL: NONREACTIVE
WBC # BLD AUTO: 6.4 10E3/UL (ref 4–11)

## 2022-02-01 PROCEDURE — 86901 BLOOD TYPING SEROLOGIC RH(D): CPT | Performed by: STUDENT IN AN ORGANIZED HEALTH CARE EDUCATION/TRAINING PROGRAM

## 2022-02-01 PROCEDURE — 258N000003 HC RX IP 258 OP 636: Performed by: STUDENT IN AN ORGANIZED HEALTH CARE EDUCATION/TRAINING PROGRAM

## 2022-02-01 PROCEDURE — 85025 COMPLETE CBC W/AUTO DIFF WBC: CPT | Performed by: STUDENT IN AN ORGANIZED HEALTH CARE EDUCATION/TRAINING PROGRAM

## 2022-02-01 PROCEDURE — 370N000003 HC ANESTHESIA WARD SERVICE

## 2022-02-01 PROCEDURE — 36415 COLL VENOUS BLD VENIPUNCTURE: CPT | Performed by: STUDENT IN AN ORGANIZED HEALTH CARE EDUCATION/TRAINING PROGRAM

## 2022-02-01 PROCEDURE — 59400 OBSTETRICAL CARE: CPT | Performed by: NURSE ANESTHETIST, CERTIFIED REGISTERED

## 2022-02-01 PROCEDURE — 120N000001 HC R&B MED SURG/OB

## 2022-02-01 PROCEDURE — 86780 TREPONEMA PALLIDUM: CPT | Performed by: STUDENT IN AN ORGANIZED HEALTH CARE EDUCATION/TRAINING PROGRAM

## 2022-02-01 PROCEDURE — 10907ZC DRAINAGE OF AMNIOTIC FLUID, THERAPEUTIC FROM PRODUCTS OF CONCEPTION, VIA NATURAL OR ARTIFICIAL OPENING: ICD-10-PCS | Performed by: STUDENT IN AN ORGANIZED HEALTH CARE EDUCATION/TRAINING PROGRAM

## 2022-02-01 PROCEDURE — 250N000009 HC RX 250: Performed by: NURSE ANESTHETIST, CERTIFIED REGISTERED

## 2022-02-01 PROCEDURE — 0HQ9XZZ REPAIR PERINEUM SKIN, EXTERNAL APPROACH: ICD-10-PCS | Performed by: STUDENT IN AN ORGANIZED HEALTH CARE EDUCATION/TRAINING PROGRAM

## 2022-02-01 PROCEDURE — 722N000001 HC LABOR CARE VAGINAL DELIVERY SINGLE

## 2022-02-01 PROCEDURE — U0005 INFEC AGEN DETEC AMPLI PROBE: HCPCS | Performed by: STUDENT IN AN ORGANIZED HEALTH CARE EDUCATION/TRAINING PROGRAM

## 2022-02-01 PROCEDURE — 0UQG7ZZ REPAIR VAGINA, VIA NATURAL OR ARTIFICIAL OPENING: ICD-10-PCS | Performed by: STUDENT IN AN ORGANIZED HEALTH CARE EDUCATION/TRAINING PROGRAM

## 2022-02-01 PROCEDURE — 250N000013 HC RX MED GY IP 250 OP 250 PS 637: Performed by: STUDENT IN AN ORGANIZED HEALTH CARE EDUCATION/TRAINING PROGRAM

## 2022-02-01 PROCEDURE — 250N000009 HC RX 250: Performed by: STUDENT IN AN ORGANIZED HEALTH CARE EDUCATION/TRAINING PROGRAM

## 2022-02-01 PROCEDURE — 59400 OBSTETRICAL CARE: CPT | Performed by: STUDENT IN AN ORGANIZED HEALTH CARE EDUCATION/TRAINING PROGRAM

## 2022-02-01 PROCEDURE — 250N000011 HC RX IP 250 OP 636: Performed by: NURSE ANESTHETIST, CERTIFIED REGISTERED

## 2022-02-01 RX ORDER — LIDOCAINE 40 MG/G
CREAM TOPICAL
Status: DISCONTINUED | OUTPATIENT
Start: 2022-02-01 | End: 2022-02-01 | Stop reason: HOSPADM

## 2022-02-01 RX ORDER — NALBUPHINE HYDROCHLORIDE 10 MG/ML
2.5-5 INJECTION, SOLUTION INTRAMUSCULAR; INTRAVENOUS; SUBCUTANEOUS EVERY 6 HOURS PRN
Status: DISCONTINUED | OUTPATIENT
Start: 2022-02-01 | End: 2022-02-01

## 2022-02-01 RX ORDER — OXYTOCIN/0.9 % SODIUM CHLORIDE 30/500 ML
340 PLASTIC BAG, INJECTION (ML) INTRAVENOUS CONTINUOUS PRN
Status: DISCONTINUED | OUTPATIENT
Start: 2022-02-01 | End: 2022-02-01 | Stop reason: HOSPADM

## 2022-02-01 RX ORDER — OXYTOCIN/0.9 % SODIUM CHLORIDE 30/500 ML
1-24 PLASTIC BAG, INJECTION (ML) INTRAVENOUS CONTINUOUS
Status: DISCONTINUED | OUTPATIENT
Start: 2022-02-01 | End: 2022-02-01 | Stop reason: HOSPADM

## 2022-02-01 RX ORDER — ONDANSETRON 4 MG/1
4 TABLET, ORALLY DISINTEGRATING ORAL EVERY 6 HOURS PRN
Status: DISCONTINUED | OUTPATIENT
Start: 2022-02-01 | End: 2022-02-01 | Stop reason: HOSPADM

## 2022-02-01 RX ORDER — LIDOCAINE HYDROCHLORIDE AND EPINEPHRINE 15; 5 MG/ML; UG/ML
INJECTION, SOLUTION EPIDURAL PRN
Status: DISCONTINUED | OUTPATIENT
Start: 2022-02-01 | End: 2022-02-01

## 2022-02-01 RX ORDER — METOCLOPRAMIDE HYDROCHLORIDE 5 MG/ML
10 INJECTION INTRAMUSCULAR; INTRAVENOUS EVERY 6 HOURS PRN
Status: DISCONTINUED | OUTPATIENT
Start: 2022-02-01 | End: 2022-02-01 | Stop reason: HOSPADM

## 2022-02-01 RX ORDER — HYDROCORTISONE 2.5 %
CREAM (GRAM) TOPICAL 3 TIMES DAILY PRN
Status: DISCONTINUED | OUTPATIENT
Start: 2022-02-01 | End: 2022-02-02 | Stop reason: HOSPADM

## 2022-02-01 RX ORDER — PROCHLORPERAZINE MALEATE 10 MG
10 TABLET ORAL EVERY 6 HOURS PRN
Status: DISCONTINUED | OUTPATIENT
Start: 2022-02-01 | End: 2022-02-01 | Stop reason: HOSPADM

## 2022-02-01 RX ORDER — ONDANSETRON 2 MG/ML
4 INJECTION INTRAMUSCULAR; INTRAVENOUS EVERY 6 HOURS PRN
Status: DISCONTINUED | OUTPATIENT
Start: 2022-02-01 | End: 2022-02-01 | Stop reason: HOSPADM

## 2022-02-01 RX ORDER — NALOXONE HYDROCHLORIDE 0.4 MG/ML
0.4 INJECTION, SOLUTION INTRAMUSCULAR; INTRAVENOUS; SUBCUTANEOUS
Status: DISCONTINUED | OUTPATIENT
Start: 2022-02-01 | End: 2022-02-01 | Stop reason: HOSPADM

## 2022-02-01 RX ORDER — METOCLOPRAMIDE 10 MG/1
10 TABLET ORAL EVERY 6 HOURS PRN
Status: DISCONTINUED | OUTPATIENT
Start: 2022-02-01 | End: 2022-02-01 | Stop reason: HOSPADM

## 2022-02-01 RX ORDER — LIDOCAINE HYDROCHLORIDE 10 MG/ML
INJECTION, SOLUTION INFILTRATION; PERINEURAL PRN
Status: DISCONTINUED | OUTPATIENT
Start: 2022-02-01 | End: 2022-02-01

## 2022-02-01 RX ORDER — METHYLERGONOVINE MALEATE 0.2 MG/ML
200 INJECTION INTRAVENOUS
Status: DISCONTINUED | OUTPATIENT
Start: 2022-02-01 | End: 2022-02-02 | Stop reason: HOSPADM

## 2022-02-01 RX ORDER — NALOXONE HYDROCHLORIDE 0.4 MG/ML
0.2 INJECTION, SOLUTION INTRAMUSCULAR; INTRAVENOUS; SUBCUTANEOUS
Status: DISCONTINUED | OUTPATIENT
Start: 2022-02-01 | End: 2022-02-01 | Stop reason: HOSPADM

## 2022-02-01 RX ORDER — ACETAMINOPHEN 325 MG/1
650 TABLET ORAL EVERY 4 HOURS PRN
Status: DISCONTINUED | OUTPATIENT
Start: 2022-02-01 | End: 2022-02-02 | Stop reason: HOSPADM

## 2022-02-01 RX ORDER — TRANEXAMIC ACID 10 MG/ML
1 INJECTION, SOLUTION INTRAVENOUS EVERY 30 MIN PRN
Status: DISCONTINUED | OUTPATIENT
Start: 2022-02-01 | End: 2022-02-02 | Stop reason: HOSPADM

## 2022-02-01 RX ORDER — OXYTOCIN 10 [USP'U]/ML
10 INJECTION, SOLUTION INTRAMUSCULAR; INTRAVENOUS
Status: DISCONTINUED | OUTPATIENT
Start: 2022-02-01 | End: 2022-02-02 | Stop reason: HOSPADM

## 2022-02-01 RX ORDER — IBUPROFEN 600 MG/1
600 TABLET, FILM COATED ORAL
Status: DISCONTINUED | OUTPATIENT
Start: 2022-02-01 | End: 2022-02-01

## 2022-02-01 RX ORDER — PROCHLORPERAZINE 25 MG
25 SUPPOSITORY, RECTAL RECTAL EVERY 12 HOURS PRN
Status: DISCONTINUED | OUTPATIENT
Start: 2022-02-01 | End: 2022-02-01 | Stop reason: HOSPADM

## 2022-02-01 RX ORDER — KETOROLAC TROMETHAMINE 30 MG/ML
30 INJECTION, SOLUTION INTRAMUSCULAR; INTRAVENOUS
Status: DISCONTINUED | OUTPATIENT
Start: 2022-02-01 | End: 2022-02-01

## 2022-02-01 RX ORDER — METHYLERGONOVINE MALEATE 0.2 MG/ML
200 INJECTION INTRAVENOUS
Status: DISCONTINUED | OUTPATIENT
Start: 2022-02-01 | End: 2022-02-01 | Stop reason: HOSPADM

## 2022-02-01 RX ORDER — OXYTOCIN/0.9 % SODIUM CHLORIDE 30/500 ML
340 PLASTIC BAG, INJECTION (ML) INTRAVENOUS CONTINUOUS PRN
Status: DISCONTINUED | OUTPATIENT
Start: 2022-02-01 | End: 2022-02-02 | Stop reason: HOSPADM

## 2022-02-01 RX ORDER — FENTANYL CITRATE-0.9 % NACL/PF 10 MCG/ML
100 PLASTIC BAG, INJECTION (ML) INTRAVENOUS EVERY 5 MIN PRN
Status: DISCONTINUED | OUTPATIENT
Start: 2022-02-01 | End: 2022-02-01 | Stop reason: HOSPADM

## 2022-02-01 RX ORDER — SODIUM CHLORIDE, SODIUM LACTATE, POTASSIUM CHLORIDE, CALCIUM CHLORIDE 600; 310; 30; 20 MG/100ML; MG/100ML; MG/100ML; MG/100ML
INJECTION, SOLUTION INTRAVENOUS CONTINUOUS
Status: DISCONTINUED | OUTPATIENT
Start: 2022-02-01 | End: 2022-02-01 | Stop reason: HOSPADM

## 2022-02-01 RX ORDER — LIDOCAINE HYDROCHLORIDE AND EPINEPHRINE 15; 5 MG/ML; UG/ML
3 INJECTION, SOLUTION EPIDURAL
Status: DISCONTINUED | OUTPATIENT
Start: 2022-02-01 | End: 2022-02-01 | Stop reason: HOSPADM

## 2022-02-01 RX ORDER — CARBOPROST TROMETHAMINE 250 UG/ML
250 INJECTION, SOLUTION INTRAMUSCULAR
Status: DISCONTINUED | OUTPATIENT
Start: 2022-02-01 | End: 2022-02-02 | Stop reason: HOSPADM

## 2022-02-01 RX ORDER — MODIFIED LANOLIN
OINTMENT (GRAM) TOPICAL
Status: DISCONTINUED | OUTPATIENT
Start: 2022-02-01 | End: 2022-02-02 | Stop reason: HOSPADM

## 2022-02-01 RX ORDER — MISOPROSTOL 100 UG/1
400 TABLET ORAL
Status: DISCONTINUED | OUTPATIENT
Start: 2022-02-01 | End: 2022-02-01 | Stop reason: HOSPADM

## 2022-02-01 RX ORDER — OXYTOCIN 10 [USP'U]/ML
10 INJECTION, SOLUTION INTRAMUSCULAR; INTRAVENOUS
Status: DISCONTINUED | OUTPATIENT
Start: 2022-02-01 | End: 2022-02-01 | Stop reason: HOSPADM

## 2022-02-01 RX ORDER — IBUPROFEN 400 MG/1
800 TABLET, FILM COATED ORAL EVERY 6 HOURS PRN
Status: DISCONTINUED | OUTPATIENT
Start: 2022-02-01 | End: 2022-02-02 | Stop reason: HOSPADM

## 2022-02-01 RX ORDER — DOCUSATE SODIUM 100 MG/1
100 CAPSULE, LIQUID FILLED ORAL DAILY
Status: DISCONTINUED | OUTPATIENT
Start: 2022-02-01 | End: 2022-02-02 | Stop reason: HOSPADM

## 2022-02-01 RX ORDER — TRANEXAMIC ACID 10 MG/ML
1 INJECTION, SOLUTION INTRAVENOUS EVERY 30 MIN PRN
Status: DISCONTINUED | OUTPATIENT
Start: 2022-02-01 | End: 2022-02-01 | Stop reason: HOSPADM

## 2022-02-01 RX ORDER — BISACODYL 10 MG
10 SUPPOSITORY, RECTAL RECTAL DAILY PRN
Status: DISCONTINUED | OUTPATIENT
Start: 2022-02-01 | End: 2022-02-02 | Stop reason: HOSPADM

## 2022-02-01 RX ORDER — SODIUM CHLORIDE, SODIUM LACTATE, POTASSIUM CHLORIDE, CALCIUM CHLORIDE 600; 310; 30; 20 MG/100ML; MG/100ML; MG/100ML; MG/100ML
INJECTION, SOLUTION INTRAVENOUS CONTINUOUS PRN
Status: DISCONTINUED | OUTPATIENT
Start: 2022-02-01 | End: 2022-02-01 | Stop reason: HOSPADM

## 2022-02-01 RX ORDER — MISOPROSTOL 100 UG/1
400 TABLET ORAL
Status: DISCONTINUED | OUTPATIENT
Start: 2022-02-01 | End: 2022-02-02 | Stop reason: HOSPADM

## 2022-02-01 RX ORDER — OXYTOCIN 10 [USP'U]/ML
10 INJECTION, SOLUTION INTRAMUSCULAR; INTRAVENOUS
Status: DISCONTINUED | OUTPATIENT
Start: 2022-02-01 | End: 2022-02-01

## 2022-02-01 RX ORDER — OXYTOCIN/0.9 % SODIUM CHLORIDE 30/500 ML
100-340 PLASTIC BAG, INJECTION (ML) INTRAVENOUS CONTINUOUS PRN
Status: DISCONTINUED | OUTPATIENT
Start: 2022-02-01 | End: 2022-02-01

## 2022-02-01 RX ORDER — CARBOPROST TROMETHAMINE 250 UG/ML
250 INJECTION, SOLUTION INTRAMUSCULAR
Status: DISCONTINUED | OUTPATIENT
Start: 2022-02-01 | End: 2022-02-01 | Stop reason: HOSPADM

## 2022-02-01 RX ADMIN — LIDOCAINE HYDROCHLORIDE AND EPINEPHRINE 5 ML: 15; 5 INJECTION, SOLUTION EPIDURAL at 08:02

## 2022-02-01 RX ADMIN — IBUPROFEN 800 MG: 400 TABLET, FILM COATED ORAL at 19:38

## 2022-02-01 RX ADMIN — Medication 10 ML/HR: at 08:13

## 2022-02-01 RX ADMIN — SODIUM CHLORIDE, POTASSIUM CHLORIDE, SODIUM LACTATE AND CALCIUM CHLORIDE 1000 ML: 600; 310; 30; 20 INJECTION, SOLUTION INTRAVENOUS at 05:59

## 2022-02-01 RX ADMIN — Medication 2 MILLI-UNITS/MIN: at 06:16

## 2022-02-01 RX ADMIN — LIDOCAINE HYDROCHLORIDE 2 ML: 10 INJECTION, SOLUTION INFILTRATION; PERINEURAL at 07:55

## 2022-02-01 RX ADMIN — SODIUM CHLORIDE, POTASSIUM CHLORIDE, SODIUM LACTATE AND CALCIUM CHLORIDE: 600; 310; 30; 20 INJECTION, SOLUTION INTRAVENOUS at 07:03

## 2022-02-01 ASSESSMENT — ACTIVITIES OF DAILY LIVING (ADL)
TOILETING_ISSUES: NO
FALL_HISTORY_WITHIN_LAST_SIX_MONTHS: NO

## 2022-02-01 NOTE — ANESTHESIA PROCEDURE NOTES
Epidural catheter Procedure Note    Pre-Procedure   Staff -        CRNA: Fei Garcia APRN CRNA       Performed By: CRNA       Location: OB       Procedure Start/Stop Times: 2/1/2022 7:44 AM and 2/1/2022 8:14 AM       Pre-Anesthestic Checklist: patient identified, IV checked, risks and benefits discussed, informed consent, monitors and equipment checked, pre-op evaluation, at physician/surgeon's request and post-op pain management  Timeout:       Correct Patient: Yes        Correct Procedure: Yes        Correct Site: Yes        Correct Position: Yes   Procedure Documentation  Procedure: epidural catheter       Diagnosis: Labor Pain       Patient Position: sitting       Patient Prep/Sterile Barriers: sterile gloves, mask, patient draped       Skin prep: Chloraprep      Local skin infiltrated with mL of 1% lidocaine.        Insertion Site: L3-4. (midline approach).       Technique: LORT air        TROY at 4.5 cm.       Needle Type: Touhy needle       Needle Gauge: 17.        Needle Length (Inches): 3.5         Catheter threaded easily.         Threaded 12 cm at skin.         # of attempts: 1 and  # of redirects:  0    Assessment/Narrative         Paresthesias: No.     Test dose of mL lidocaine 1.5% w/ 1:200,000 epinephrine at.         Test dose negative, 3 minutes after injection, for signs of intravascular, subdural, or intrathecal injection.       Insertion/Infusion Method: LORT air       Aspiration negative for Heme or CSF via Epidural Catheter.

## 2022-02-01 NOTE — H&P
[facility] Labor and Delivery Admission H&P    Rowena Mcqueen MRN# [id number]   Age: 31 year old YOB: 1990     Date of Admission:  2022    Primary care provider: Ceci Alexander           Chief Complaint:   Rowena Mcqueen is a 31 year old  at 39w3d by 7 week  US admitted for eIOL.          Pregnancy history:   This pregnancy has been complicated by history of pyelonephritis s/p treatment and prophlyaxis after. She also has stable left adnexal cyst which we have been monitoring throughout.       OBSTETRIC HISTORY:    OB History    Para Term  AB Living   4 2 2 0 1 2   SAB IAB Ectopic Multiple Live Births   1 0 0 0 0      # Outcome Date GA Lbr Red/2nd Weight Sex Delivery Anes PTL Lv   4 Current            3 Term 18    M       2 Term 05/21/15    M       1 SAB      AB, MISSED          PRENATAL LABS:   Lab Results   Component Value Date    ABO O 2021    RH Pos 2021    AS Negative 2022    HEPBANG Nonreactive 2021    HGB 10.3 (L) 2022         MEDICATIONS:  Medications Prior to Admission   Medication Sig Dispense Refill Last Dose     cephALEXin (KEFLEX) 500 MG capsule Take 1 capsule (500 mg) by mouth daily 30 capsule 0      Cholecalciferol (VITAMIN D3) 50 MCG ( UT) CAPS Take 1 capsule by mouth daily        cyanocobalamin (VITAMIN B-12) 1000 MCG tablet Take by mouth daily        Prenatal Vit-Fe Fumarate-FA (PRENATAL MULTIVITAMIN  PLUS IRON) 27-1 MG TABS Take 1 tablet by mouth daily      .        Maternal Past Medical History:     Past Medical History:   Diagnosis Date     Contact dermatitis and eczema 2021    Formatting of this note might be different from the original. Scalp dermatitis, dermatological consult pending.     History of other genital system and obstetric disorders     Premature at 37 weeks with birth weight 5 lbs 15 oz requiring oxygen first 36 hours of life.     She denies any acute or chronic  medical conditions  She specifically denies asthma, HTN, DM or history of VTE    SURGICAL HISTORY:  Past Surgical History:   Procedure Laterality Date     WISDOM TOOTH EXTRACTION       GYN History:  No history of STIs  Last pap smear: 2018 NIL  No history of abnormal pap smears    ALLERGIES:     Allergies   Allergen Reactions     Food Itching and Swelling     Apples- Gums swell and throat itches  Hazelnuts - Itchy throat     Seasonal Allergies      Penicillins Hives and Rash     Other reaction(s): Throat Swelling/Closing           Family History:     Family History   Problem Relation Age of Onset     Family History Negative Mother         Good Health     Other - See Comments Father         Psychiatric illness,ADHD and depression     Family History Negative Brother         Good Health     Hypertension Maternal Grandmother         Hypertension     Other - See Comments Maternal Grandfather         Peripheral vascular disease/ of myelodysplasia age 74 and CAD/ age 74 of coronary artery disease     Arthritis Paternal Grandmother         Arthritis     Heart Disease Paternal Grandfather         Heart Disease, of MI age 58     Family History Negative Other         Good Health     Family History Negative Sister         Good Health                 Social History:     Social History     Tobacco Use     Smoking status: Never Smoker     Smokeless tobacco: Never Used   Vaping Use     Vaping Use: Never used   Substance Use Topics     Alcohol use: Not Currently     Comment: Alcoholic Drinks/day: occasional     Drug use: Never     Lives at home with her  and children         Review of Systems:   ROS:   Skin: negative for rash, bruising  Eyes: negative for visual blurring, double vision  Ears/Nose/Throat: negative for nasal congestion, vertigo  Respiratory: No shortness of breath, dyspnea on exertion, cough, or hemoptysis  Cardiovascular: negative for palpitations, chest pain, lower extremity edema and syncope or  near-syncope  Gastrointestinal: negative for, nausea, vomiting and hematemesis  Genitourinary: negative for, dysuria, frequency and urgency  Musculoskeletal: negative for, back pain and muscular weakness  Neurologic: negative for, headaches, syncope, seizures and local weakness  Psychiatric: negative for, anxiety, depression and hallucinations  Hematologic/Lymphatic/Immunologic: negative for, anemia, chills and fever           Physical Exam:     Patient Vitals for the past 8 hrs:   BP Temp Temp src Pulse Resp SpO2   22 108/65 97.5  F (36.4  C) Temporal 77 16 99 %   22 -- -- -- -- -- 99 %     Gen: Alert and oriented, No acute distress, well-appearing  CV: Normal heart rate to mild tachycardia with labor, regular rhythm, no audible murmur or gallop  Resp: Lungs clear to auscultation, non-labored respirations  Abd: Soft, gravid, intermittent contractions palpated, no point tenderness  Ext: No sign of asymmetric edema, erythema, or asymmetric size. No pain with movement, Negative Hattie's sign    Cervix: 2/50/-3  Membranes: AROM 8:30 pm  EFW by Leopolds: 3100 gram   Presentation:Cephalic    FHT: 130 bpm baseline, moderate  variability, + accelerations, no decelerations (Cat 1)  [place]: 3-4 per 10 minutes        Assessment:   Rowena Mcqueen is a 31 year old  at 39w3d admitted for [facility], doing well. This pregnancy is complicated by left adnexal cyst which has been stable.        Plan:       # Term IUP: Labor progress  -- SVE: 2/50/-2  -- [place]: 3-4 q10 min  -- Membranes: AROM 8:30 am  -- Confirmed cephalic by bsus    # FWB  -- CEFM: 130 bpm baseline, moderate  variability, + accelerations, no decelerations (Cat 1)  -- EFW: 3100 gms by robertods    # Routine Prenatal Care  -- Datinw5d ROBIN: 2022  -- PNLs:              O+, Ab screen neg              RPR nr              Hep B S Ag neg              Rubella immune              HIV neg              NIL pap     -- Genetic Screening: [place] low  risk male,   -- Anatomy US: Normal anatomy from   -- Immunizations: Tdap   -- 3rd TM labs including CBC, RPR: Hgb 10.6, RPRnr  -- 1 hr GTT: 122  -- GBS: Negative  -- Postpartum Planning: Breastfeeding    # PMH/PSH  -- Ovarian cyst  -- history of pyelonephritis in this pregnancy  -- History of covid in this pregnancy    # Obstetric history  --     x2   Pelvis proven to 7lb 6oz    # Ovarian cyst  -- stable throughout pregnancy: plan for interval removal and bilateral salpingectomy    # PPH risk  -- moderate    # PP Planning  -- Plans to breastfeed     # Pain  -- Epidural in place    # Plan  -- Admit to labor and delivery  -- continue pitocin, s/p AROM  -- SVE as clinically indicated  -- Anticipate       LUCIANO GARCIA MD on 2022 at 8:36 AM

## 2022-02-01 NOTE — PROGRESS NOTES
During recurrent variable decelerations we were turning pt from side to side, started a fluid bolus and discontinued our Pit. Dr. Hewitt present in dept and aware of category II tracing.

## 2022-02-01 NOTE — PROGRESS NOTES
Pt here for induction of labor. Pt placed on EUM/EFM, , category 1 tracing noted. Pt gretel every 6 minutes. Vital signs stable. Pt would like epidural right away. Bolus started. Pitocin started.

## 2022-02-01 NOTE — PROGRESS NOTES
Single viable baby boy born via spontaneous vaginal delivery on 02/02/22 at 1329. Delivered by Dr. Hewitt. Spontaneous respirations, with vigorous cry.   Induced  Labor at 39 weeks gestation   Mom's GBS status Negative with antibiotic treatment not indicated 4 hours prior to delivery.   baby placed on mother's abdomen for  ID bands applied to baby,mother, and S.O. Initial    Will continue to monitor and provide interventions as needed.

## 2022-02-01 NOTE — L&D DELIVERY NOTE
Vaginal Delivery Note    Ms. Mcqueen presented to labor and delivery for eIOL.     Her labor was augmented with pitocin and pain control offered via an epidural. During the second stage of labor she pushed to deliver a baby boy. The baby delivered in a direct OA manner and then restituted in a counter- clockwise manner to face maternal left leg. With gentle downward guidance the anterior, left shoulder was easily delivered. The posterior shoulder quickly followed. He immediately started crying and after back stimulation was vigorously crying. The umbilical cord was then clamped and cut without difficulty by the father. The baby was handed up on to mom's chest.     Active management of the third stage of labor included gentle downward traction on the umbilical cord and suprapubic pressure to ensure no uterine inversion. A small gush of blood signaled placental separation and shortly after that the placenta delivered intact without difficulty. At this time pitocin was started. Uterine fundal massage as well as bimanual exam revealed a firm uterus. A fundal sweep performed revealed some blood clots in the uterine cavity that were manually extracted. There were no remaining placental parts or membranes. Good hemostasis was noted.    An examination of the vaginal mucosa and perineum revealed a first degree perineal laceration. This was repaired in the usual running fashion with 3-0 vicryl suture. Another region  On the right vaginal wall was noted to be oozing despite pressure and this was repaired in a figure-of-eight stitch. Hemostasis was noted after repair.  Both Kylah and her son, tolerated the delivery well and were recovering in the delivery room together.     LUCIANO GARCIA MD on 2/1/2022 at 1:53 PM

## 2022-02-01 NOTE — ANESTHESIA PREPROCEDURE EVALUATION
Anesthesia Pre-Procedure Evaluation    Patient: Rowena Mcqueen   MRN: [id number] : 1990        Preoperative Diagnosis: * No pre-op diagnosis entered *    Procedure : * No procedures listed *          Past Medical History:   Diagnosis Date     Contact dermatitis and eczema 2021    Formatting of this note might be different from the original. Scalp dermatitis, dermatological consult pending.     History of other genital system and obstetric disorders     Premature at 37 weeks with birth weight 5 lbs 15 oz requiring oxygen first 36 hours of life.      Past Surgical History:   Procedure Laterality Date     WISDOM TOOTH EXTRACTION        Allergies   Allergen Reactions     Food Itching and Swelling     Apples- Gums swell and throat itches  Hazelnuts - Itchy throat     Seasonal Allergies      Penicillins Hives and Rash     Other reaction(s): Throat Swelling/Closing      Social History     Tobacco Use     Smoking status: Never Smoker     Smokeless tobacco: Never Used   Substance Use Topics     Alcohol use: Not Currently     Comment: Alcoholic Drinks/day: occasional      Wt Readings from Last 1 Encounters:   22 75.3 kg (165 lb 14.4 oz)        Anesthesia Evaluation   Pt has had prior anesthetic. Type: OB Labor Epidural.    No history of anesthetic complications       ROS/MED HX  ENT/Pulmonary:  - neg pulmonary ROS     Neurologic:  - neg neurologic ROS     Cardiovascular:  - neg cardiovascular ROS     METS/Exercise Tolerance: >4 METS    Hematologic:  - neg hematologic  ROS     Musculoskeletal:  - neg musculoskeletal ROS     GI/Hepatic:  - neg GI/hepatic ROS     Renal/Genitourinary:  - neg Renal ROS     Endo:  - neg endo ROS     Psychiatric/Substance Use:  - neg psychiatric ROS     Infectious Disease:  - neg infectious disease ROS  (-) Recent Fever   Malignancy:  - neg malignancy ROS     Other:      (+) Possibly pregnant, ,         Physical Exam    Airway        Mallampati: I   TM distance: > 3 FB   Neck  ROM: full   Mouth opening: > 3 cm    Respiratory Devices and Support         Dental  no notable dental history         Cardiovascular          Rhythm and rate: regular and normal     Pulmonary   pulmonary exam normal        breath sounds clear to auscultation           OUTSIDE LABS:  CBC:   Lab Results   Component Value Date    WBC 6.4 02/01/2022    WBC 6.3 01/04/2022    HGB 10.3 (L) 02/01/2022    HGB 11.0 (L) 01/04/2022    HCT 30.7 (L) 02/01/2022    HCT 32.0 (L) 01/04/2022     02/01/2022     01/04/2022     BMP:   Lab Results   Component Value Date     01/04/2022     (L) 12/25/2021    POTASSIUM 3.6 01/04/2022    POTASSIUM 3.4 (L) 12/25/2021    CHLORIDE 102 01/04/2022    CHLORIDE 102 12/25/2021    CO2 23 01/04/2022    CO2 23 12/25/2021    BUN 7 01/04/2022    BUN 5 (L) 12/25/2021    CR 0.52 (L) 01/04/2022    CR 0.52 (L) 12/25/2021    GLC 76 01/04/2022    GLC 84 12/25/2021     COAGS: No results found for: PTT, INR, FIBR  POC:   Lab Results   Component Value Date    HCG Positive (A) 06/01/2021     HEPATIC:   Lab Results   Component Value Date    ALBUMIN 3.6 01/04/2022    PROTTOTAL 6.6 01/04/2022    ALT 47 01/04/2022    AST 23 01/04/2022    ALKPHOS 119 (H) 01/04/2022    BILITOTAL 0.8 01/04/2022     OTHER:   Lab Results   Component Value Date    REHANA 8.9 01/04/2022       Anesthesia Plan    ASA Status:  2   NPO Status:  NPO Appropriate    Anesthesia Type: Epidural.              Consents    Anesthesia Plan(s) and associated risks, benefits, and realistic alternatives discussed. Questions answered and patient/representative(s) expressed understanding.     - Discussed: Risks, Benefits and Alternatives for BOTH SEDATION and the PROCEDURE were discussed     - Discussed with:  Patient         Postoperative Care            Comments:                SAMIR CASON CRNA

## 2022-02-02 VITALS
RESPIRATION RATE: 18 BRPM | HEART RATE: 75 BPM | SYSTOLIC BLOOD PRESSURE: 116 MMHG | TEMPERATURE: 97.7 F | DIASTOLIC BLOOD PRESSURE: 69 MMHG | OXYGEN SATURATION: 99 %

## 2022-02-02 LAB — HGB BLD-MCNC: 10 G/DL (ref 11.7–15.7)

## 2022-02-02 PROCEDURE — 250N000013 HC RX MED GY IP 250 OP 250 PS 637: Performed by: STUDENT IN AN ORGANIZED HEALTH CARE EDUCATION/TRAINING PROGRAM

## 2022-02-02 PROCEDURE — 85018 HEMOGLOBIN: CPT | Performed by: STUDENT IN AN ORGANIZED HEALTH CARE EDUCATION/TRAINING PROGRAM

## 2022-02-02 PROCEDURE — 36415 COLL VENOUS BLD VENIPUNCTURE: CPT | Performed by: STUDENT IN AN ORGANIZED HEALTH CARE EDUCATION/TRAINING PROGRAM

## 2022-02-02 RX ADMIN — BENZOCAINE AND LEVOMENTHOL: 200; 5 SPRAY TOPICAL at 08:37

## 2022-02-02 RX ADMIN — ACETAMINOPHEN 650 MG: 325 TABLET, FILM COATED ORAL at 08:33

## 2022-02-02 RX ADMIN — IBUPROFEN 800 MG: 400 TABLET, FILM COATED ORAL at 11:58

## 2022-02-02 RX ADMIN — DOCUSATE SODIUM 100 MG: 100 CAPSULE, LIQUID FILLED ORAL at 08:33

## 2022-02-02 RX ADMIN — ACETAMINOPHEN 650 MG: 325 TABLET, FILM COATED ORAL at 01:44

## 2022-02-02 RX ADMIN — ACETAMINOPHEN 650 MG: 325 TABLET, FILM COATED ORAL at 14:29

## 2022-02-02 RX ADMIN — IBUPROFEN 800 MG: 400 TABLET, FILM COATED ORAL at 04:49

## 2022-02-02 NOTE — DISCHARGE SUMMARY
Admit date: 2022  Discharge date: 22    Admit Dx:   - 31 year old  at 39w3d   - eIOL    Discharge Dx:  - Same as above, s/p     Procedures:  -       Admit HPI:  Ms. Rowena Mcqueen is a   at 39w3d who was admitted for eIOL on 2022. Please see her admit H&P for full details of her PMH, PSH, Meds, Allergies and exam on admit.    Hospital course:  Rowena Mcqueen was admitted to the hospital on 2022 for the above listed indications. She had an uncomplicated vaginal delivery and uncomplicated postpartum course. She was meeting all milestones for discharge on PPD1      Discharge/Disposition:  Rowena Mcqueen was discharged to home in stable condition      LUCIANO GARCIA MD on 2022 at 8:03 AM

## 2022-02-02 NOTE — PROGRESS NOTES
" Postpartum Rounding Progress Note    Ms. Mcqueen is PPD #1 today after an uncomplicated . She reports feeling well with no acute concerns at this time. She is eager to go home. Her bleeding has slowed down and she does not endorse any clots. Pain has been well controlled with alternating tylenol and ibuprofen. She has been up and ambulating well, without feeling light-headed or dizzy. Tolerating normal diet, has been able to urinate normally and is passing flatus but has not yet had a bowel movement. No concerns for leg pain or calf pain.  She reports her mood is good. We discussed what to expect as she recovers at home including continued, scant lochia, mood fluctuations and uterine cramping, especially with breastfeeding.       Exam  Vitals:  BP Readings from Last 1 Encounters:   22 104/57     Pulse Readings from Last 1 Encounters:   22 76     Wt Readings from Last 1 Encounters:   22 75.3 kg (165 lb 14.4 oz)     Ht Readings from Last 1 Encounters:   22 1.689 m (5' 6.5\")     Estimated body mass index is 26.38 kg/m  as calculated from the following:    Height as of 22: 1.689 m (5' 6.5\").    Weight as of 22: 75.3 kg (165 lb 14.4 oz).  Temp Readings from Last 1 Encounters:   22 97  F (36.1  C) (Temporal)       General: No acute distress, well-appearing  CV: Normal rate, no pallor  Lungs: Non-labored respirations  Abdominal: Uterine fundus is firm, midline and just below umbilicus  Extremities: Normal movement, mild, symmetric bilateral lower extremity swelling, no sign of erythema or asymmetry. Negative Hattie's bilaterally    Assessment & Plan  Ms. Mcqueen is a 31 y.o.  s/p  at 39w3d following a vaginal delivery following pregnancy complicated by pyelonephritis earlier in pregnancy and stable ovarian cyst.    #PPD 1  --Meeting milestones appropriately  --Lochia bleeding w/o clots  --Pain adequately controlled with PRNs  --Tolerating regular diet and ambulating " well    #IWB  --baby boy Steve at bedside, doing well  --Breast feeding    # Routine Prenatal Care  -- Datinw5d ROBIN: 2022  -- PNLs:              O+, Ab screen neg              RPR nr              Hep B S Ag neg              Rubella immune              HIV neg              NIL pap     -- Genetic Screening: [place] low risk male,   -- Anatomy US: Normal anatomy from   -- Immunizations: Tdap   -- 3rd TM labs including CBC, RPR: Hgb 10.6, RPRnr  -- 1 hr GTT: 122  -- GBS: Negative    #Contraception  --Discussed options with the patient   --Patient has decided on interval BTL    #Disposition  --Continue routine postpartum care  --Anticipate discharge today  --Follow up in clinic in 6 weeks    LUCIANO GARCIA MD on 2022 at 7:38 AM

## 2022-02-02 NOTE — ANESTHESIA POSTPROCEDURE EVALUATION
Patient: Rowena Mcqueen    Procedure: * No procedures listed *       Diagnosis:* No pre-op diagnosis entered *  Diagnosis Additional Information: No value filed.    Anesthesia Type:  Epidural    Note:  Disposition: Inpatient   Postop Pain Control: Uneventful            Sign Out: Well controlled pain   PONV: No   Neuro/Psych: Uneventful            Sign Out: Acceptable/Baseline neuro status   Airway/Respiratory: Uneventful            Sign Out: Acceptable/Baseline resp. status   CV/Hemodynamics: Uneventful            Sign Out: Acceptable CV status; No obvious hypovolemia; No obvious fluid overload   Other NRE: NONE   DID A NON-ROUTINE EVENT OCCUR? No    Event details/Postop Comments:  Post Epidural Evaluation: Pt up and ambulating without difficulty.  No residual numbness or tingling.  No fever or chills.  Pt has voided without difficulty.  She was pleased with her epidural experience.            Last vitals:  Vitals Value Taken Time   BP     Temp     Pulse     Resp     SpO2         Electronically Signed By: SAMIR Brown CRNA  February 2, 2022  3:27 PM

## 2022-02-02 NOTE — LACTATION NOTE
This note was copied from a baby's chart.  INPATIENT LACTATION CONSULT      Consult with Kylah and srinivasa regarding breastfeeding.  Kylah nursed her other 2 children for about 6 months each with some formula supplementation. Kylah states she notices obvious rooting with a strong latch during feedings.  Rhythmic and aggressive suckling also noted.  Instructed Kylah on correct positioning and technique when latching babe on.  Kylah is independent with latching babe onto breast.  Minimal assistance required.  Encouraged Kylah on the importance of frequent feedings throughout the day (at least 8-12 feedings in a 24 hour period) and skin to skin contact.  Kylah demonstrated and states she understands all information given.    Latasha Fernandes RN, IBCLC  Lactation Consultant  [facility]

## 2022-02-02 NOTE — PLAN OF CARE
Fundus: firm without massage at umbilicus. Lochia: rubra, moderate amount, with no clots.   Swelling to perineum; ice pack & witch hazel pads utilized. Supportive bra on. Ambulated to the bathroom with SBA. Attempted to void; no urine spontaneously returned. Attentive to 's needs.     Temp: 97.9  F (36.6  C) Temp src: Temporal BP: 117/69 Pulse: 77   Resp: 16 SpO2: 93 % O2 Device: None (Room air)      Cathy TON, RN, PHN on 2022 at 6:52 PM

## 2022-02-02 NOTE — PLAN OF CARE
Rowena Mcqueen is doing well after her vaginal delivery. Fundus has remained firm, 1 below umbilicus with light flow and no clots. Pt is breast feeding well. Pt has had minimal amounts of pain that are well managed with oral analgesics. Pt also using ice pack to perineum for swelling. Pt is ambulating appropriately in room. She is voided 550mL at 1900, has since been voiding well. Pt has verbalized understanding of routine cares as well as warning signs to be aware of.

## 2022-02-02 NOTE — PROGRESS NOTES
NSG DISCHARGE NOTE    Patient discharged to home a 1510  PM via ambulation. Accompanied by significant other and staff. Discharge instructions reviewed with patient, opportunity offered to ask questions. Prescriptions sent to patients preferred pharmacy. All belongings sent with patient.    Viviana Constantino RN

## 2022-02-04 ENCOUNTER — HOSPITAL ENCOUNTER (OUTPATIENT)
Dept: OBGYN | Facility: OTHER | Age: 32
End: 2022-02-04
Attending: PEDIATRICS
Payer: COMMERCIAL

## 2022-02-04 ENCOUNTER — LACTATION ENCOUNTER (OUTPATIENT)
Age: 32
End: 2022-02-04

## 2022-02-04 PROCEDURE — S9443 LACTATION CLASS: HCPCS | Performed by: REGISTERED NURSE

## 2022-02-04 NOTE — LACTATION NOTE
This note was copied from a baby's chart.  Outpatient Lactation Visit    Steve Mcqueen  [id number]    Consultation Date: 2022     Reason for Lactation Referral: Initial Lactation Consult    Baby's : 2021    Baby's Current Age: 3 day old  Baby's Gestational Age: Gestational Age: 39w3d    Primary Care Provider: Christy Esteves    Presenting Problem (concerns as stated by parent): no concerns - Repeat bilirubin level obtained    MATERNAL HISTORY   History of Breast Surgery: no  Breast Changes During Pregnancy: no  Breast Feeding History: nursed other 2 children with no problems  Maternal Meds: daily prenatal vitamin  Pregnancy Complications: none  Anesthesia during labor: epidural    MATERNAL ASSESSMENT    Breast Size: average, symmetrical, soft after feeding and filling prior to feeding  Nipple Appearance - Left: slightly cracked, with signs of healing, education on further healing techniques provided  Nipple Appearance - Right: slightly cracked, with signs of healing, education on further healing techniques provided  Nipple Erectility - Left: erect with stimulation  Nipple Erectility - Right: erect with stimulation  Areolas Compressibility: soft  Nipple Size: average  Special Equipment Used: none  Day mother reports milk came in:  Day 3    INFANT ASSESSMENT    Oral Anatomy  Mouth: normal  Palate: normal  Jaw: normal  Tongue: normal  Frenulum: normal   Digital Suck Exam: root    FEEDING   Feeding Time: aggressively for 20 minutes  Position:  cradle  Effort to Latch: awake and alert, latched easily  Duration of Breast Feeding: Right Breast: 10; Left Breast: 10  Results: excellent breast feed    Volume of Intake:    Birth Weight: 7 lb 11.8 oz    Hospital discharge weight: 7 lb 10.7 oz (discharged after 24 hours)    Today's Weight 7 lb 2 oz    Total Intake: 0.5 oz  Output: 3-4 soil diapers in last 24 hours, 3-4 wet diapers in last 24 hours    LATCH Score:   Latch: 2 - Good Latch  Audible Swallowing:  2 - Spontaneous & frequent  Type of Nipple: (Breast/Nipple) 2 - Everted  Comfort: 2 - Soft, Nontender  Hold: 2 - No Assist   Total LATCH Score:  10    FEEDING PLAN    Home Feeding Plan: Continue to feed on demand when  elicits feeding cues with deep latch.  Babe should be eating 8-12 times in a 24 hour period.  Exclusivity explained and encouraged in the early weeks to establish breastfeeding and order in milk supply.  Rooming-in encouraged with explanation of the benefits.  Continue to apply expressed breast milk and Lanolin cream to nipples after feedings for healing and comfort.  Postpartum breastfeeding assessment completed and education provided.  Items included in the education are:     proper positioning and latch    effectiveness of feeding    manual expression    handling and storing breastmilk    maintenance of breastfeeding for the first 6 months    sign/symptoms of infant feeding issues requiring referral to qualified health care provider    LACTATION COMMENTS   Bilirubin level at 13.7 mg/dl. Dr. Esteves notified. Patient status reported. No new orders received. Instructed parents to bring babe back over the weekend if he is really sleepy/sluggish at the breast, or if his yellowing seems to worsen.  Deep latch explained for proper positioning of breast in infant's mouth, maximizing milk transfer and comfort.  Reassurance and encouragement provided in regard to mom's concerns about milk supply.  Follow-up support information provided.  Parents plan to keep [facility] Well-Child Check with Dr. Esteves as scheduled for 2 week well child check.      Face-to-face Time: 60 minutes with assessment and education.    Latasha Fernandes RN  2022  9:51 AM

## 2022-02-15 ENCOUNTER — MEDICAL CORRESPONDENCE (OUTPATIENT)
Dept: HEALTH INFORMATION MANAGEMENT | Facility: OTHER | Age: 32
End: 2022-02-15
Payer: COMMERCIAL

## 2022-03-07 ENCOUNTER — OFFICE VISIT (OUTPATIENT)
Dept: OBGYN | Facility: OTHER | Age: 32
End: 2022-03-07
Attending: OBSTETRICS & GYNECOLOGY
Payer: COMMERCIAL

## 2022-03-07 VITALS
BODY MASS INDEX: 23.43 KG/M2 | SYSTOLIC BLOOD PRESSURE: 116 MMHG | TEMPERATURE: 97.9 F | HEART RATE: 84 BPM | WEIGHT: 147.4 LBS | DIASTOLIC BLOOD PRESSURE: 72 MMHG

## 2022-03-07 DIAGNOSIS — R39.89 URINARY PROBLEM: Primary | ICD-10-CM

## 2022-03-07 DIAGNOSIS — R30.0 DYSURIA: ICD-10-CM

## 2022-03-07 LAB
ALBUMIN UR-MCNC: NEGATIVE MG/DL
APPEARANCE UR: CLEAR
BACTERIA #/AREA URNS HPF: ABNORMAL /HPF
BILIRUB UR QL STRIP: NEGATIVE
COLOR UR AUTO: ABNORMAL
GLUCOSE UR STRIP-MCNC: NEGATIVE MG/DL
HGB UR QL STRIP: ABNORMAL
KETONES UR STRIP-MCNC: NEGATIVE MG/DL
LEUKOCYTE ESTERASE UR QL STRIP: ABNORMAL
NITRATE UR QL: NEGATIVE
PH UR STRIP: 6 [PH] (ref 5–9)
RBC URINE: 6 /HPF
SP GR UR STRIP: 1.01 (ref 1–1.03)
SQUAMOUS EPITHELIAL: 2 /HPF
UROBILINOGEN UR STRIP-MCNC: NORMAL MG/DL
WBC URINE: 34 /HPF

## 2022-03-07 PROCEDURE — 99213 OFFICE O/P EST LOW 20 MIN: CPT | Performed by: OBSTETRICS & GYNECOLOGY

## 2022-03-07 PROCEDURE — 87086 URINE CULTURE/COLONY COUNT: CPT | Mod: ZL | Performed by: OBSTETRICS & GYNECOLOGY

## 2022-03-07 PROCEDURE — 81001 URINALYSIS AUTO W/SCOPE: CPT | Mod: ZL | Performed by: OBSTETRICS & GYNECOLOGY

## 2022-03-07 RX ORDER — NITROFURANTOIN 25; 75 MG/1; MG/1
100 CAPSULE ORAL 2 TIMES DAILY
Qty: 14 CAPSULE | Refills: 0 | Status: SHIPPED | OUTPATIENT
Start: 2022-03-07 | End: 2022-03-14

## 2022-03-07 ASSESSMENT — PAIN SCALES - GENERAL: PAINLEVEL: MODERATE PAIN (5)

## 2022-03-07 NOTE — PROGRESS NOTES
CC: postpartum exam at 5 weeks from delivery with concerns of dysuria and low back pain    HPI: Rowena Mcqueen presents for a work in postpartum exam for painful urination and low back pain. She was just recently treated with Bactrim DS and now has recurrent symptoms. Her pregnancy was complicated by recurrent UTI's.    Past Medical History:   Diagnosis Date     Contact dermatitis and eczema 5/21/2021    Formatting of this note might be different from the original. Scalp dermatitis, dermatological consult pending.     History of other genital system and obstetric disorders     Premature at 37 weeks with birth weight 5 lbs 15 oz requiring oxygen first 36 hours of life.     Past Surgical History:   Procedure Laterality Date     WISDOM TOOTH EXTRACTION       Current Outpatient Medications   Medication     Cholecalciferol (VITAMIN D3) 50 MCG (2000 UT) CAPS     Prenatal Vit-Fe Fumarate-FA (PRENATAL MULTIVITAMIN  PLUS IRON) 27-1 MG TABS     No current facility-administered medications for this visit.      Allergies   Allergen Reactions     Food Itching and Swelling     Apples- Gums swell and throat itches  Hazelnuts - Itchy throat     Seasonal Allergies      Penicillins Hives and Rash     Other reaction(s): Throat Swelling/Closing     REVIEW OF SYSTEMS:  Neg for bowel, bladder, and breast    O: /72   Pulse 84   Temp 97.9  F (36.6  C) (Tympanic)   Wt 66.9 kg (147 lb 6.4 oz)   LMP 04/25/2021 (Exact Date)   Breastfeeding Yes   BMI 23.43 kg/m    Body mass index is 23.43 kg/m .    Exam:  Constitutional: healthy, alert, active and no distress      Results for orders placed or performed in visit on 03/07/22   UA with Microscopic reflex to Culture     Status: Abnormal    Specimen: Urine, Midstream   Result Value Ref Range    Color Urine Light Yellow Colorless, Straw, Light Yellow, Yellow    Appearance Urine Clear Clear    Glucose Urine Negative Negative mg/dL    Bilirubin Urine Negative Negative    Ketones Urine  Negative Negative mg/dL    Specific Gravity Urine 1.014 1.000 - 1.030    Blood Urine Moderate (A) Negative    pH Urine 6.0 5.0 - 9.0    Protein Albumin Urine Negative Negative mg/dL    Urobilinogen Urine Normal Normal, 2.0 mg/dL    Nitrite Urine Negative Negative    Leukocyte Esterase Urine Large (A) Negative    Bacteria Urine Few (A) None Seen /HPF    RBC Urine 6 (H) <=2 /HPF    WBC Urine 34 (H) <=5 /HPF    Squamous Epithelials Urine 2 (H) <=1 /HPF    Narrative    Urine Culture ordered based on laboratory criteria       I/P:  (R39.89) Urinary problem  (primary encounter diagnosis)  Comment:   Plan: UA with Microscopic reflex to Culture, Urine         Culture            (R30.0) Dysuria  Comment:   Plan: nitroFURantoin macrocrystal-monohydrate         (MACROBID) 100 MG capsule            Delbert Storey MD FACOG  3:48 PM 3/7/2022        Alert and oriented to person, place and time

## 2022-03-07 NOTE — NURSING NOTE
Pt presents to clinic today for lower right back pain and is concerned about UTI.      Medication Reconciliation: complete  Cori Thayer LPN     normal appearance , without tenderness upon palpation , no deformities , trachea midline

## 2022-03-09 LAB — BACTERIA UR CULT: NORMAL

## 2022-03-26 ENCOUNTER — HEALTH MAINTENANCE LETTER (OUTPATIENT)
Age: 32
End: 2022-03-26

## 2022-04-06 ENCOUNTER — MEDICAL CORRESPONDENCE (OUTPATIENT)
Dept: HEALTH INFORMATION MANAGEMENT | Facility: OTHER | Age: 32
End: 2022-04-06
Payer: COMMERCIAL

## 2022-05-09 ENCOUNTER — PRENATAL OFFICE VISIT (OUTPATIENT)
Dept: OBGYN | Facility: OTHER | Age: 32
End: 2022-05-09
Attending: STUDENT IN AN ORGANIZED HEALTH CARE EDUCATION/TRAINING PROGRAM
Payer: COMMERCIAL

## 2022-05-09 VITALS
HEART RATE: 80 BPM | DIASTOLIC BLOOD PRESSURE: 70 MMHG | SYSTOLIC BLOOD PRESSURE: 122 MMHG | WEIGHT: 150.1 LBS | BODY MASS INDEX: 23.86 KG/M2

## 2022-05-09 DIAGNOSIS — N39.0 FREQUENT UTI: ICD-10-CM

## 2022-05-09 DIAGNOSIS — N83.209 CYST OF OVARY, UNSPECIFIED LATERALITY: Primary | ICD-10-CM

## 2022-05-09 LAB
ALBUMIN UR-MCNC: NEGATIVE MG/DL
APPEARANCE UR: CLEAR
BILIRUB UR QL STRIP: NEGATIVE
COLOR UR AUTO: ABNORMAL
GLUCOSE UR STRIP-MCNC: NEGATIVE MG/DL
HGB UR QL STRIP: NEGATIVE
KETONES UR STRIP-MCNC: NEGATIVE MG/DL
LEUKOCYTE ESTERASE UR QL STRIP: ABNORMAL
MUCOUS THREADS #/AREA URNS LPF: PRESENT /LPF
NITRATE UR QL: NEGATIVE
PH UR STRIP: 5.5 [PH] (ref 5–9)
RBC URINE: <1 /HPF
SP GR UR STRIP: 1.01 (ref 1–1.03)
SQUAMOUS EPITHELIAL: 4 /HPF
UROBILINOGEN UR STRIP-MCNC: NORMAL MG/DL
WBC URINE: 6 /HPF

## 2022-05-09 PROCEDURE — 87086 URINE CULTURE/COLONY COUNT: CPT | Mod: ZL | Performed by: STUDENT IN AN ORGANIZED HEALTH CARE EDUCATION/TRAINING PROGRAM

## 2022-05-09 PROCEDURE — 81001 URINALYSIS AUTO W/SCOPE: CPT | Mod: ZL | Performed by: STUDENT IN AN ORGANIZED HEALTH CARE EDUCATION/TRAINING PROGRAM

## 2022-05-09 PROCEDURE — 99213 OFFICE O/P EST LOW 20 MIN: CPT | Performed by: STUDENT IN AN ORGANIZED HEALTH CARE EDUCATION/TRAINING PROGRAM

## 2022-05-09 ASSESSMENT — EDINBURGH POSTNATAL DEPRESSION SCALE (EPDS)
I HAVE LOOKED FORWARD WITH ENJOYMENT TO THINGS: AS MUCH AS I EVER DID
I HAVE FELT SAD OR MISERABLE: NO, NOT AT ALL
TOTAL SCORE: 0
THE THOUGHT OF HARMING MYSELF HAS OCCURRED TO ME: NEVER
I HAVE BEEN SO UNHAPPY THAT I HAVE BEEN CRYING: NO, NEVER
THINGS HAVE BEEN GETTING ON TOP OF ME: NO, I HAVE BEEN COPING AS WELL AS EVER
I HAVE BEEN ANXIOUS OR WORRIED FOR NO GOOD REASON: NO, NOT AT ALL
I HAVE BEEN SO UNHAPPY THAT I HAVE HAD DIFFICULTY SLEEPING: NOT AT ALL
THINGS HAVE BEEN GETTING ON TOP OF ME: NO, I HAVE BEEN COPING AS WELL AS EVER
I HAVE BEEN SO UNHAPPY THAT I HAVE HAD DIFFICULTY SLEEPING: NOT AT ALL
I HAVE FELT SAD OR MISERABLE: NO, NOT AT ALL
I HAVE BLAMED MYSELF UNNECESSARILY WHEN THINGS WENT WRONG: NO, NEVER
I HAVE BEEN SO UNHAPPY THAT I HAVE BEEN CRYING: NO, NEVER
I HAVE BLAMED MYSELF UNNECESSARILY WHEN THINGS WENT WRONG: NO, NEVER
THE THOUGHT OF HARMING MYSELF HAS OCCURRED TO ME: NEVER
I HAVE BEEN ANXIOUS OR WORRIED FOR NO GOOD REASON: NO, NOT AT ALL
I HAVE BEEN ABLE TO LAUGH AND SEE THE FUNNY SIDE OF THINGS: AS MUCH AS I ALWAYS COULD
I HAVE LOOKED FORWARD WITH ENJOYMENT TO THINGS: AS MUCH AS I EVER DID
I HAVE FELT SCARED OR PANICKY FOR NO GOOD REASON: NO, NOT AT ALL
I HAVE FELT SCARED OR PANICKY FOR NO GOOD REASON: NO, NOT AT ALL
I HAVE BEEN ABLE TO LAUGH AND SEE THE FUNNY SIDE OF THINGS: AS MUCH AS I ALWAYS COULD

## 2022-05-09 ASSESSMENT — ANXIETY QUESTIONNAIRES
GAD7 TOTAL SCORE: 0
GAD7 TOTAL SCORE: 0
5. BEING SO RESTLESS THAT IT IS HARD TO SIT STILL: NOT AT ALL
6. BECOMING EASILY ANNOYED OR IRRITABLE: NOT AT ALL
8. IF YOU CHECKED OFF ANY PROBLEMS, HOW DIFFICULT HAVE THESE MADE IT FOR YOU TO DO YOUR WORK, TAKE CARE OF THINGS AT HOME, OR GET ALONG WITH OTHER PEOPLE?: NOT DIFFICULT AT ALL
7. FEELING AFRAID AS IF SOMETHING AWFUL MIGHT HAPPEN: NOT AT ALL
7. FEELING AFRAID AS IF SOMETHING AWFUL MIGHT HAPPEN: NOT AT ALL
1. FEELING NERVOUS, ANXIOUS, OR ON EDGE: NOT AT ALL
GAD7 TOTAL SCORE: 0
2. NOT BEING ABLE TO STOP OR CONTROL WORRYING: NOT AT ALL
3. WORRYING TOO MUCH ABOUT DIFFERENT THINGS: NOT AT ALL
4. TROUBLE RELAXING: NOT AT ALL

## 2022-05-09 ASSESSMENT — PATIENT HEALTH QUESTIONNAIRE - PHQ9
SUM OF ALL RESPONSES TO PHQ QUESTIONS 1-9: 0
10. IF YOU CHECKED OFF ANY PROBLEMS, HOW DIFFICULT HAVE THESE PROBLEMS MADE IT FOR YOU TO DO YOUR WORK, TAKE CARE OF THINGS AT HOME, OR GET ALONG WITH OTHER PEOPLE: NOT DIFFICULT AT ALL
SUM OF ALL RESPONSES TO PHQ QUESTIONS 1-9: 0

## 2022-05-09 NOTE — PROGRESS NOTES
"Date of Surgery: 6/15/22  Type of Surgery: BS, ovarian cyst excision   Surgeon: Dr. Elsa Rene     appropriate appointments were scheduled by the [facility] . Surgical forms were copied and kept for informative purposes. Originals were delivered to Day-surgery. Questions were answered to the best of this nurse's ability.        STOP BANG    Fever/Chills or other infectious symptoms in past month? no  >10 pound weight loss in the past 2 months? no  Health Care Directive on file? no  History of blood transfusions? no  Td up to date? yes  History of VRE/MRSA? mo      Obstructive Sleep Apnea screening    Preoperative Evaluation: Obstructive Sleep Apnea screening    S: Snore -  Do you snore loudly? (louder than talking or loud enough to be heard through closed doors) No  T: Tired - Do you often feel tired, fatigued, or sleepy during the daytime?No  O: Observed - Has anyone ever observed you stop breathing during your sleep?No  P: Pressure - Do you have or are you being treated for high blood pressure?No  B: BMI - BMI greater than 35kg/m2?No  A: Age - Age over 50 years old?No  N: Neck - Neck circumference greater than 40 cm?No  G: Gender - Gender: Male?No    Total number of \"YES\" responses:  0    Scoring: Low risk of GAMALIEL 0-2  At Risk of GAMALIEL: >3 High Risk of GAMALIEL: 5-8      Total yes answers in GAMALIEL section:    Low risk 0-2  At risk 3-4  High risk 5-8    Nikki Gibson RN............. 5/9/2022 1:18 PM     "

## 2022-05-09 NOTE — NURSING NOTE
Pt presents to clinic today for post partum check.    Medication Reconciliation: complete  Cori Thayer LPN

## 2022-05-10 ASSESSMENT — ANXIETY QUESTIONNAIRES: GAD7 TOTAL SCORE: 0

## 2022-05-10 ASSESSMENT — PATIENT HEALTH QUESTIONNAIRE - PHQ9: SUM OF ALL RESPONSES TO PHQ QUESTIONS 1-9: 0

## 2022-05-11 LAB — BACTERIA UR CULT: NORMAL

## 2022-05-11 NOTE — PROGRESS NOTES
Postpartum Office Visit     S:         Ms. Mcqueen is a  who is s/p an  on 2022. Her delivery was uncomplicated. Her postpartum course in the hospital was also uncomplicated. She is feeling well today. She has no acute concerns. She notes her mood has been good, denies depression or any concern for harm to herself or others. She has been breast feeding the baby. She has not yet been sexually active. Her  has had a vasectomy performed and she is interested in a bilateral salpingectomy as well for contraception.      She notes that she felt her cyst for the first time a few weeks ago. She is ready to continue with surgical planning for removal. We were planning on surgical removal of this cyst before her last pregnancy, but then discovered she was newly pregnant in our workup. It has remained stable in size throughout the pregnancy and has simple characteristics.     O:        /70   Pulse 80   Wt 68.1 kg (150 lb 1.6 oz)   BMI 23.86 kg/m    Gen:    Well-appearing, NAD  Pelvic: politely declined as she feels well and we are planning surgery/ exam under anesthesia.     Assessment:  Ms. Rowena Mcqueen is a 31 year old yo now  who is s/p  on 2022. She is doing well in the postpartum period. She is ready to take next steps for surgical removal of a persistent ovarian cyst.     Plan:  # Routine postpartum care  -- Feeling well, no concerns  -- breast feeding  -- Contraception goals: Vasectomy and would also like bilateral salpingectomy at time of ovarian cyst removal  -- Mood stable and doing well    # Ovarian cyst  -- Repeat US ordered today to ensure it is still present. Has been stable around 7-8 cm throughout the pregnancy  -- Discussed that our goal will be to perform laparoscopic ovarian cystectomy and leave remaining normal ovarian tissue in place, but she understands that if the cyst is encompassing the entire ovary, the ovary may need to be removed. We discussed  that her body will continue to get all of the appropriate amount of estrogen from her contralateral ovary if a removal is needed. We discussed the risks/benefits of surgical removal and all questions were answered.    Consented today for exam under anesthesia, laparoscopic ovarian cystectomy, bilateral salpingectomy.   This will be a same-day procedure and she will meet with primary provider for pre-op appointment    We discussed that after anesthesia, she will pump-and-dump her milk supply immediately postoperatively if she is still nursing at that time, but it will not decrease her milk supply etc.      Return to clinic with any questions or concerns     Esla Rene MD  OB/GYN  5/9/2022 12:55 PM    Answers for HPI/ROS submitted by the patient on 5/9/2022  If you checked off any problems, how difficult have these problems made it for you to do your work, take care of things at home, or get along with other people?: Not difficult at all  PHQ9 TOTAL SCORE: 0  KATELYN 7 TOTAL SCORE: 0

## 2022-05-19 ENCOUNTER — HOSPITAL ENCOUNTER (OUTPATIENT)
Dept: ULTRASOUND IMAGING | Facility: OTHER | Age: 32
Discharge: HOME | End: 2022-05-19
Attending: STUDENT IN AN ORGANIZED HEALTH CARE EDUCATION/TRAINING PROGRAM | Admitting: STUDENT IN AN ORGANIZED HEALTH CARE EDUCATION/TRAINING PROGRAM
Payer: COMMERCIAL

## 2022-05-19 DIAGNOSIS — N83.209 CYST OF OVARY, UNSPECIFIED LATERALITY: ICD-10-CM

## 2022-05-19 PROCEDURE — 76830 TRANSVAGINAL US NON-OB: CPT

## 2022-06-09 ENCOUNTER — OFFICE VISIT (OUTPATIENT)
Dept: FAMILY MEDICINE | Facility: OTHER | Age: 32
End: 2022-06-09
Attending: PHYSICIAN ASSISTANT
Payer: COMMERCIAL

## 2022-06-09 VITALS
DIASTOLIC BLOOD PRESSURE: 66 MMHG | BODY MASS INDEX: 24.2 KG/M2 | OXYGEN SATURATION: 99 % | RESPIRATION RATE: 16 BRPM | SYSTOLIC BLOOD PRESSURE: 102 MMHG | TEMPERATURE: 98.3 F | HEART RATE: 80 BPM | WEIGHT: 152.2 LBS

## 2022-06-09 DIAGNOSIS — Z30.09 BIRTH CONTROL COUNSELING: ICD-10-CM

## 2022-06-09 DIAGNOSIS — Z01.818 PREOP GENERAL PHYSICAL EXAM: Primary | ICD-10-CM

## 2022-06-09 DIAGNOSIS — L30.9 DERMATITIS: ICD-10-CM

## 2022-06-09 DIAGNOSIS — J30.9 ALLERGIC RHINITIS, UNSPECIFIED SEASONALITY, UNSPECIFIED TRIGGER: ICD-10-CM

## 2022-06-09 DIAGNOSIS — N94.9 ADNEXAL CYST: ICD-10-CM

## 2022-06-09 DIAGNOSIS — N83.202 CYST OF LEFT OVARY: ICD-10-CM

## 2022-06-09 PROBLEM — Z34.90 ENCOUNTER FOR PLANNED INDUCTION OF LABOR: Status: RESOLVED | Noted: 2022-02-01 | Resolved: 2022-06-09

## 2022-06-09 PROBLEM — Z36.89 ENCOUNTER FOR TRIAGE IN PREGNANT PATIENT: Status: RESOLVED | Noted: 2022-01-04 | Resolved: 2022-06-09

## 2022-06-09 LAB
HCG UR QL: NEGATIVE
HGB BLD-MCNC: 13.3 G/DL (ref 11.7–15.7)

## 2022-06-09 PROCEDURE — 36415 COLL VENOUS BLD VENIPUNCTURE: CPT | Mod: ZL | Performed by: PHYSICIAN ASSISTANT

## 2022-06-09 PROCEDURE — 85018 HEMOGLOBIN: CPT | Mod: ZL | Performed by: PHYSICIAN ASSISTANT

## 2022-06-09 PROCEDURE — 99214 OFFICE O/P EST MOD 30 MIN: CPT | Performed by: PHYSICIAN ASSISTANT

## 2022-06-09 PROCEDURE — 81025 URINE PREGNANCY TEST: CPT | Mod: ZL | Performed by: PHYSICIAN ASSISTANT

## 2022-06-09 RX ORDER — TRIAMCINOLONE ACETONIDE 1 MG/G
OINTMENT TOPICAL 2 TIMES DAILY
Qty: 80 G | Refills: 0 | Status: SHIPPED | OUTPATIENT
Start: 2022-06-09 | End: 2023-11-01

## 2022-06-09 ASSESSMENT — PATIENT HEALTH QUESTIONNAIRE - PHQ9
SUM OF ALL RESPONSES TO PHQ QUESTIONS 1-9: 0
SUM OF ALL RESPONSES TO PHQ QUESTIONS 1-9: 0
10. IF YOU CHECKED OFF ANY PROBLEMS, HOW DIFFICULT HAVE THESE PROBLEMS MADE IT FOR YOU TO DO YOUR WORK, TAKE CARE OF THINGS AT HOME, OR GET ALONG WITH OTHER PEOPLE: NOT DIFFICULT AT ALL

## 2022-06-09 ASSESSMENT — ANXIETY QUESTIONNAIRES
1. FEELING NERVOUS, ANXIOUS, OR ON EDGE: NOT AT ALL
6. BECOMING EASILY ANNOYED OR IRRITABLE: NOT AT ALL
7. FEELING AFRAID AS IF SOMETHING AWFUL MIGHT HAPPEN: NOT AT ALL
GAD7 TOTAL SCORE: 0
4. TROUBLE RELAXING: NOT AT ALL
GAD7 TOTAL SCORE: 0
3. WORRYING TOO MUCH ABOUT DIFFERENT THINGS: NOT AT ALL
7. FEELING AFRAID AS IF SOMETHING AWFUL MIGHT HAPPEN: NOT AT ALL
5. BEING SO RESTLESS THAT IT IS HARD TO SIT STILL: NOT AT ALL
GAD7 TOTAL SCORE: 0
2. NOT BEING ABLE TO STOP OR CONTROL WORRYING: NOT AT ALL
8. IF YOU CHECKED OFF ANY PROBLEMS, HOW DIFFICULT HAVE THESE MADE IT FOR YOU TO DO YOUR WORK, TAKE CARE OF THINGS AT HOME, OR GET ALONG WITH OTHER PEOPLE?: NOT DIFFICULT AT ALL

## 2022-06-09 ASSESSMENT — PAIN SCALES - GENERAL: PAINLEVEL: NO PAIN (0)

## 2022-06-09 NOTE — H&P (VIEW-ONLY)
[facility] AND [facility]  [address]  [place] [address]  Phone: [phone]  Fax: [phone]  Primary Provider: Ceci Alexander  Pre-op Performing Provider: ROB CHAVEZ      PREOPERATIVE EVALUATION:  Today's date: 6/9/2022    Rowena Mcqueen is a 31 year old female who presents for a preoperative evaluation.    Surgical Information:  Surgery/Procedure:  EXCISION, CYST, BENIGN, Left  OVARY, LAPAROSCOPIC, bilateral salpingectomy  Surgery Location: [facility]  Surgeon: Dr. Rene   Surgery Date: 6/15/22  Time of Surgery: TBD  Where patient plans to recover: At home with family  Fax number for surgical facility: Note does not need to be faxed, will be available electronically in Epic.    Type of Anesthesia Anticipated: General    Assessment & Plan     The proposed surgical procedure is considered INTERMEDIATE risk.    Problem List Items Addressed This Visit        Respiratory    Allergic rhinitis       Endocrine    Cyst of right ovary       Urinary    Adnexal cyst      Other Visit Diagnoses     Preop general physical exam    -  Primary    Relevant Orders    Hemoglobin (Completed)    Pregnancy, Urine (HCG) (Completed)    Birth control counseling        Dermatitis        Relevant Medications    triamcinolone (KENALOG) 0.1 % external ointment        Dermatitis: Patient was given triamcinolone ointment for treatment.  He warning signs and symptoms.  Encourage cool compresses as needed for symptomatic relief.  Recheck if symptoms are not improving or worsening especially prior to surgery.    Completed hemoglobin and UPT for preoperative clearance.  Labs are negative.  No acute concerns at this time prior to surgery.    Risks and Recommendations:  The patient has the following additional risks and recommendations for perioperative complications:   - No identified additional risk factors other than previously addressed    Medication Instructions:  Patient Instructions     Patient  should take the following medications the morning of surgery with a small sip of water: hold all meds.  Patient was instructed to hold the following medications the morning of surgery: hold all meds.     Patient was advised to call our office and the surgical services with any change in condition or new symptoms if they were to develop between today and their surgical date.  Especially any cardiopulmonary symptoms or symptoms concerning for an infection.     Discontinue aspirin, aleve, naproxen and ibuprofen 7 days prior to surgery to reduce bleeding risk.  It is fine to take tylenol the week before surgery.  Hold vitamins and herbal remedies for 7 days before surgery.    Please have a [employer] COVID-19 test on 2022.   Please stay in your car and call [phone] when you arrive.     Preparing for Your Surgery  Getting started  A nurse will call you to review your health history and instructions. They will give you an arrival time based on your scheduled surgery time. Please be ready to share:    Your doctor's clinic name and phone number    Your medical, surgical and anesthesia history    A list of allergies and sensitivities    A list of medicines, including herbal treatments and over-the-counter drugs    Whether the patient has a legal guardian (ask how to send us the papers in advance)  Please tell us if you're pregnant--or if there's any chance you might be pregnant. Some surgeries may injure a fetus (unborn baby), so they require a pregnancy test. Surgeries that are safe for a fetus don't always need a test, and you can choose whether to have one.   If you have a child who's having surgery, please ask for a copy of Preparing for Your Child's Surgery.    Preparing for surgery  1. Within 30 days of surgery: Have a pre-op exam (sometimes called an H&P, or History and Physical). This can be done at a clinic or pre-operative center.  ? If you're having a , you may not need this exam. Talk to your  care team.  2. At your pre-op exam, talk to your care team about all medicines you take. If you need to stop any medicines before surgery, ask when to start taking them again.  ? We do this for your safety. Many medicines can make you bleed too much during surgery. Some change how well surgery (anesthesia) drugs work.  3. Call your insurance company to let them know you're having surgery. (If you don't have insurance, call [phone].)  4. Call your clinic if there's any change in your health. This includes signs of a cold or flu (sore throat, runny nose, cough, rash, fever). It also includes a scrape or scratch near the surgery site.  5. If you have questions on the day of surgery, call your hospital or surgery center.  COVID testing  You may need to be tested for COVID-19 before having surgery. If so, we will give you instructions.  Eating and drinking guidelines  For your safety: Unless your surgeon tells you otherwise, follow the guidelines below.    Eat and drink as usual until 8 hours before surgery. After that, no food or milk.    Drink clear liquids until 2 hours before surgery. These are liquids you can see through, like water, Gatorade and Propel Water. You may also have black coffee and tea (no cream or milk).    Nothing by mouth within 2 hours of surgery. This includes gum, candy and breath mints.    If you drink alcohol: Stop drinking it the night before surgery.    If your care team tells you to take medicine on the morning of surgery, it's okay to take it with a sip of water.  Preventing infection  1. Shower or bathe the night before and morning of your surgery. Follow the instructions your clinic gave you. (If no instructions, use regular soap.)  2. Don't shave or clip hair near your surgery site. We'll remove the hair if needed.  3. Don't smoke or vape the morning of surgery. You may chew nicotine gum up to 2 hours before surgery. A nicotine patch is okay.  ? Note: Some surgeries require you to  completely quit smoking and nicotine. Check with your surgeon.  4. Your care team will make every effort to keep you safe from infection. We will:  ? Clean our hands often with soap and water (or an alcohol-based hand rub).  ? Clean the skin at your surgery site with a special soap that kills germs.  ? Give you a special gown to keep you warm. (Cold raises the risk of infection.)  ? Wear special hair covers, masks, gowns and gloves during surgery.  ? Give antibiotic medicine, if prescribed. Not all surgeries need antibiotics.  What to bring on the day of surgery  1. Photo ID and insurance card  2. Copy of your health care directive, if you have one  3. Glasses and hearing aides (bring cases)  ? You can't wear contacts during surgery  4. Inhaler and eye drops, if you use them (tell us about these when you arrive)  5. CPAP machine or breathing device, if you use them  6. A few personal items, if spending the night  7. If you have . . .  ? A pacemaker, ICD (cardiac defibrillator) or other implant: Bring the ID card.  ? An implanted stimulator: Bring the remote control.  ? A legal guardian: Bring a copy of the certified (court-stamped) guardianship papers.  Please remove any jewelry, including body piercings. Leave jewelry and other valuables at home.  If you're going home the day of surgery    You must have a responsible adult drive you home. They should stay with you overnight as well.    If you don't have someone to stay with you, and you aren't safe to go home alone, we may keep you overnight. Insurance often won't pay for this.  After surgery  If it's hard to control your pain or you need more pain medicine, please call your surgeon's office.  Questions?   If you have any questions for your care team, list them here: _________________________________________________________________________________________________________________________________________________________________________  ____________________________________ ____________________________________ ____________________________________  For informational purposes only. Not to replace the advice of your health care provider. Copyright   2003, 2019 [employer] [facility]. All rights reserved. Clinically reviewed by Della Jimenez MD. [employer] [id number] - REV 07/21.       RECOMMENDATION:  APPROVAL GIVEN to proceed with proposed procedure, without further diagnostic evaluation.    30 minutes spent on the date of the encounter doing chart review, history and exam, documentation and further activities per the note    Subjective     HPI related to upcoming procedure: Patient has a cyst on her left ovary.  They were previously planning on surgical removal of the cyst prior to her last pregnancy however then discovered that she was newly pregnant in their work-up.  Was stable throughout the pregnancy.  Currently scheduled for surgical removal of the left ovary.  Also set up for a tubal ligation.  Patient has pain on the left lower quadrant with pushing on the region.  The cyst has been there for a few years.    Preop Questions 6/9/2022   1. Have you ever had a heart attack or stroke? No   2. Have you ever had surgery on your heart or blood vessels, such as a stent placement, a coronary artery bypass, or surgery on an artery in your head, neck, heart, or legs? No   3. Do you have chest pain with activity? No   4. Do you have a history of  heart failure? No   5. Do you currently have a cold, bronchitis or symptoms of other infection? No   6. Do you have a cough, shortness of breath, or wheezing? No   7. Do you or anyone in your family have previous history of blood clots? No   8. Do you or does anyone in your family have a serious bleeding problem such as prolonged bleeding following surgeries or cuts? No   9. Have you ever had problems with anemia or been told to take iron pills? YES - history of taking iron pills with pregnancy   10. Have you had  any abnormal blood loss such as black, tarry or bloody stools, or abnormal vaginal bleeding? No   11. Have you ever had a blood transfusion? No   12. Are you willing to have a blood transfusion if it is medically needed before, during, or after your surgery? Yes   13. Have you or any of your relatives ever had problems with anesthesia? No   14. Do you have sleep apnea, excessive snoring or daytime drowsiness? No   15. Do you have any artifical heart valves or other implanted medical devices like a pacemaker, defibrillator, or continuous glucose monitor? No   16. Do you have artificial joints? No   17. Are you allergic to latex? No   18. Is there any chance that you may be pregnant? No       Health Care Directive:  Patient does not have a Health Care Directive or Living Will: Discussed advance care planning with patient; information given to patient to review.    Preoperative Review of :   reviewed - no record of controlled substances prescribed.      Status of Chronic Conditions:  Patient is history of allergic rhinitis.  Currently stable with over-the-counter medications as needed.  No acute concerns at this time.    Patient recently has had a rash on her arms bilaterally.  It is a little itchy.  No new foods, medications, lotions, soaps.  No bacterial infection concerns.  No open wound, pus, drainage, warmth, fevers, or chills.  History of rashes with stress in the past.  The rash has been there a couple weeks.  Has tried cortisone 10 over-the-counter which has not helped.    Patient has tolerated surgery well in the past.  Patient has no recent cough or cold symptoms.  No recent fevers, chills, sore throat, ear pain, chest pain, palpitations, problems breathing, stomachaches, nausea, vomiting, diarrhea, constipation, blood in stool or urine, dysuria, frequency, urgency.    Patient can walk up a flight of stairs without becoming short of breath or having chest pain: YES   Patient is able to tolerate greater  than 4 METs of activity without any cardiopulmonary symptoms.      Answers for HPI/ROS submitted by the patient on 6/9/2022  If you checked off any problems, how difficult have these problems made it for you to do your work, take care of things at home, or get along with other people?: Not difficult at all  PHQ9 TOTAL SCORE: 0  KATELYN 7 TOTAL SCORE: 0    Review of Systems  CONSTITUTIONAL: NEGATIVE for fever, chills, change in weight  INTEGUMENTARY/SKIN: NEGATIVE for worrisome rashes, moles or lesions  EYES: NEGATIVE for vision changes or irritation  ENT/MOUTH: NEGATIVE for ear, mouth and throat problems  RESP: NEGATIVE for significant cough or SOB  CV: NEGATIVE for chest pain, palpitations or peripheral edema  GI: NEGATIVE for nausea, abdominal pain, heartburn, or change in bowel habits  : NEGATIVE for frequency, dysuria, or hematuria  MUSCULOSKELETAL: NEGATIVE for significant arthralgias or myalgia  NEURO: NEGATIVE for weakness, dizziness or paresthesias  ENDOCRINE: NEGATIVE for temperature intolerance, skin/hair changes  HEME: NEGATIVE for bleeding problems  PSYCHIATRIC: NEGATIVE for changes in mood or affect    Patient Active Problem List    Diagnosis Date Noted     Allergic rhinitis 05/21/2021     Priority: Medium     Adnexal cyst 04/06/2021     Priority: Medium     Added automatically from request for surgery [id number]       Cyst of right ovary 02/09/2021     Priority: Medium     Added automatically from request for surgery [id number]        Past Medical History:   Diagnosis Date     Contact dermatitis and eczema 05/21/2021    Formatting of this note might be different from the original. Scalp dermatitis, dermatological consult pending.     History of other genital system and obstetric disorders     Premature at 37 weeks with birth weight 5 lbs 15 oz requiring oxygen first 36 hours of life.     Past Surgical History:   Procedure Laterality Date     WISDOM TOOTH EXTRACTION       Current Outpatient Medications    Medication Sig Dispense Refill     Cholecalciferol (VITAMIN D3) 50 MCG (2000 UT) CAPS Take 1 capsule by mouth daily       Cyanocobalamin (B-12) 1000 MCG TBCR        triamcinolone (KENALOG) 0.1 % external ointment Apply topically 2 times daily 80 g 0       Allergies   Allergen Reactions     Food Itching and Swelling     Apples- Gums swell and throat itches  Hazelnuts - Itchy throat     Seasonal Allergies      Penicillins Hives and Rash     Other reaction(s): Throat Swelling/Closing        Social History     Tobacco Use     Smoking status: Never Smoker     Smokeless tobacco: Never Used   Substance Use Topics     Alcohol use: Yes     Comment: Alcoholic Drinks/day: occasional     Family History   Problem Relation Age of Onset     Family History Negative Mother         Good Health     Other - See Comments Father         Psychiatric illness,ADHD and depression     Family History Negative Brother         Good Health     Hypertension Maternal Grandmother         Hypertension     Other - See Comments Maternal Grandfather         Peripheral vascular disease/ of myelodysplasia age 74 and CAD/ age 74 of coronary artery disease     Arthritis Paternal Grandmother         Arthritis     Heart Disease Paternal Grandfather         Heart Disease, of MI age 58     Family History Negative Other         Good Health     Family History Negative Sister         Good Health     History   Drug Use Unknown         Objective     /66   Pulse 80   Temp 98.3  F (36.8  C) (Tympanic)   Resp 16   Wt 69 kg (152 lb 3.2 oz)   SpO2 99%   Breastfeeding No   BMI 24.20 kg/m      Physical Exam    GENERAL APPEARANCE: healthy, alert and no distress     EYES: EOMI, PERRL     HENT: ear canals and TM's normal and nose and mouth without ulcers or lesions     NECK: no adenopathy, no asymmetry, masses, or scars and thyroid normal to palpation     RESP: lungs clear to auscultation - no rales, rhonchi or wheezes     CV: regular rates and  rhythm, normal S1 S2, no S3 or S4 and no murmur, click or rub     ABDOMEN:  soft, nontender, no HSM or masses and bowel sounds normal     MS: extremities normal- no gross deformities noted, no evidence of inflammation in joints, FROM in all extremities.     SKIN: no suspicious lesions.  Minimally erythematous papules appreciated scattered throughout the arms bilaterally.  No open wound, pus, drainage, warmth.     NEURO: Normal strength and tone, sensory exam grossly normal, mentation intact and speech normal     PSYCH: mentation appears normal. and affect normal/bright     LYMPHATICS: No cervical adenopathy    Recent Labs   Lab Test 02/02/22  0505 02/01/22  0606 01/04/22  1442 12/25/21  1222   HGB 10.0* 10.3* 11.0* 10.7*   PLT  --  240 244 225   NA  --   --  134 133*   POTASSIUM  --   --  3.6 3.4*   CR  --   --  0.52* 0.52*        Diagnostics:  Recent Results (from the past 720 hour(s))   Pregnancy, Urine (HCG)    Collection Time: 06/09/22  3:12 PM   Result Value Ref Range    hCG Urine Qualitative Negative Negative   Hemoglobin    Collection Time: 06/09/22  3:12 PM   Result Value Ref Range    Hemoglobin 13.3 11.7 - 15.7 g/dL      No EKG required, no history of coronary heart disease, significant arrhythmia, peripheral arterial disease or other structural heart disease.    Revised Cardiac Risk Index (RCRI):  The patient has the following serious cardiovascular risks for perioperative complications:   - No serious cardiac risks = 0 points     RCRI Interpretation: 0 points: Class I (very low risk - 0.4% complication rate)           Signed Electronically by: Sandy Reza PA-C  Copy of this evaluation report is provided to requesting physician.

## 2022-06-09 NOTE — PATIENT INSTRUCTIONS
Patient should take the following medications the morning of surgery with a small sip of water: hold all meds.  Patient was instructed to hold the following medications the morning of surgery: hold all meds.     Patient was advised to call our office and the surgical services with any change in condition or new symptoms if they were to develop between today and their surgical date.  Especially any cardiopulmonary symptoms or symptoms concerning for an infection.     Discontinue aspirin, aleve, naproxen and ibuprofen 7 days prior to surgery to reduce bleeding risk.  It is fine to take tylenol the week before surgery.  Hold vitamins and herbal remedies for 7 days before surgery.    Please have a [employer] COVID-19 test on 2022.   Please stay in your car and call [phone] when you arrive.     Preparing for Your Surgery  Getting started  A nurse will call you to review your health history and instructions. They will give you an arrival time based on your scheduled surgery time. Please be ready to share:  Your doctor's clinic name and phone number  Your medical, surgical and anesthesia history  A list of allergies and sensitivities  A list of medicines, including herbal treatments and over-the-counter drugs  Whether the patient has a legal guardian (ask how to send us the papers in advance)  Please tell us if you're pregnant--or if there's any chance you might be pregnant. Some surgeries may injure a fetus (unborn baby), so they require a pregnancy test. Surgeries that are safe for a fetus don't always need a test, and you can choose whether to have one.   If you have a child who's having surgery, please ask for a copy of Preparing for Your Child's Surgery.    Preparing for surgery  Within 30 days of surgery: Have a pre-op exam (sometimes called an H&P, or History and Physical). This can be done at a clinic or pre-operative center.  If you're having a , you may not need this exam. Talk to your care  team.  At your pre-op exam, talk to your care team about all medicines you take. If you need to stop any medicines before surgery, ask when to start taking them again.  We do this for your safety. Many medicines can make you bleed too much during surgery. Some change how well surgery (anesthesia) drugs work.  Call your insurance company to let them know you're having surgery. (If you don't have insurance, call [phone].)  Call your clinic if there's any change in your health. This includes signs of a cold or flu (sore throat, runny nose, cough, rash, fever). It also includes a scrape or scratch near the surgery site.  If you have questions on the day of surgery, call your hospital or surgery center.  COVID testing  You may need to be tested for COVID-19 before having surgery. If so, we will give you instructions.  Eating and drinking guidelines  For your safety: Unless your surgeon tells you otherwise, follow the guidelines below.  Eat and drink as usual until 8 hours before surgery. After that, no food or milk.  Drink clear liquids until 2 hours before surgery. These are liquids you can see through, like water, Gatorade and Propel Water. You may also have black coffee and tea (no cream or milk).  Nothing by mouth within 2 hours of surgery. This includes gum, candy and breath mints.  If you drink alcohol: Stop drinking it the night before surgery.  If your care team tells you to take medicine on the morning of surgery, it's okay to take it with a sip of water.  Preventing infection  Shower or bathe the night before and morning of your surgery. Follow the instructions your clinic gave you. (If no instructions, use regular soap.)  Don't shave or clip hair near your surgery site. We'll remove the hair if needed.  Don't smoke or vape the morning of surgery. You may chew nicotine gum up to 2 hours before surgery. A nicotine patch is okay.  Note: Some surgeries require you to completely quit smoking and nicotine.  Check with your surgeon.  Your care team will make every effort to keep you safe from infection. We will:  Clean our hands often with soap and water (or an alcohol-based hand rub).  Clean the skin at your surgery site with a special soap that kills germs.  Give you a special gown to keep you warm. (Cold raises the risk of infection.)  Wear special hair covers, masks, gowns and gloves during surgery.  Give antibiotic medicine, if prescribed. Not all surgeries need antibiotics.  What to bring on the day of surgery  Photo ID and insurance card  Copy of your health care directive, if you have one  Glasses and hearing aides (bring cases)  You can't wear contacts during surgery  Inhaler and eye drops, if you use them (tell us about these when you arrive)  CPAP machine or breathing device, if you use them  A few personal items, if spending the night  If you have . . .  A pacemaker, ICD (cardiac defibrillator) or other implant: Bring the ID card.  An implanted stimulator: Bring the remote control.  A legal guardian: Bring a copy of the certified (court-stamped) guardianship papers.  Please remove any jewelry, including body piercings. Leave jewelry and other valuables at home.  If you're going home the day of surgery  You must have a responsible adult drive you home. They should stay with you overnight as well.  If you don't have someone to stay with you, and you aren't safe to go home alone, we may keep you overnight. Insurance often won't pay for this.  After surgery  If it's hard to control your pain or you need more pain medicine, please call your surgeon's office.  Questions?   If you have any questions for your care team, list them here: _________________________________________________________________________________________________________________________________________________________________________ ____________________________________ ____________________________________ ____________________________________  For  informational purposes only. Not to replace the advice of your health care provider. Copyright   2003, 2019 [employer] [facility]. All rights reserved. Clinically reviewed by Della Jimenez MD. [employer] [id number] - REV 07/21.

## 2022-06-09 NOTE — PROGRESS NOTES
[facility] AND [facility]  [address]  [place] [address]  Phone: [phone]  Fax: [phone]  Primary Provider: Ceci Alexander  Pre-op Performing Provider: ROB CHAVEZ      PREOPERATIVE EVALUATION:  Today's date: 6/9/2022    Rowena Mcqueen is a 31 year old female who presents for a preoperative evaluation.    Surgical Information:  Surgery/Procedure:  EXCISION, CYST, BENIGN, Left  OVARY, LAPAROSCOPIC, bilateral salpingectomy  Surgery Location: [facility]  Surgeon: Dr. Rene   Surgery Date: 6/15/22  Time of Surgery: TBD  Where patient plans to recover: At home with family  Fax number for surgical facility: Note does not need to be faxed, will be available electronically in Epic.    Type of Anesthesia Anticipated: General    Assessment & Plan     The proposed surgical procedure is considered INTERMEDIATE risk.    Problem List Items Addressed This Visit        Respiratory    Allergic rhinitis       Endocrine    Cyst of right ovary       Urinary    Adnexal cyst      Other Visit Diagnoses     Preop general physical exam    -  Primary    Relevant Orders    Hemoglobin (Completed)    Pregnancy, Urine (HCG) (Completed)    Birth control counseling        Dermatitis        Relevant Medications    triamcinolone (KENALOG) 0.1 % external ointment        Dermatitis: Patient was given triamcinolone ointment for treatment.  He warning signs and symptoms.  Encourage cool compresses as needed for symptomatic relief.  Recheck if symptoms are not improving or worsening especially prior to surgery.    Completed hemoglobin and UPT for preoperative clearance.  Labs are negative.  No acute concerns at this time prior to surgery.    Risks and Recommendations:  The patient has the following additional risks and recommendations for perioperative complications:   - No identified additional risk factors other than previously addressed    Medication Instructions:  Patient Instructions     Patient  should take the following medications the morning of surgery with a small sip of water: hold all meds.  Patient was instructed to hold the following medications the morning of surgery: hold all meds.     Patient was advised to call our office and the surgical services with any change in condition or new symptoms if they were to develop between today and their surgical date.  Especially any cardiopulmonary symptoms or symptoms concerning for an infection.     Discontinue aspirin, aleve, naproxen and ibuprofen 7 days prior to surgery to reduce bleeding risk.  It is fine to take tylenol the week before surgery.  Hold vitamins and herbal remedies for 7 days before surgery.    Please have a [employer] COVID-19 test on 2022.   Please stay in your car and call [phone] when you arrive.     Preparing for Your Surgery  Getting started  A nurse will call you to review your health history and instructions. They will give you an arrival time based on your scheduled surgery time. Please be ready to share:    Your doctor's clinic name and phone number    Your medical, surgical and anesthesia history    A list of allergies and sensitivities    A list of medicines, including herbal treatments and over-the-counter drugs    Whether the patient has a legal guardian (ask how to send us the papers in advance)  Please tell us if you're pregnant--or if there's any chance you might be pregnant. Some surgeries may injure a fetus (unborn baby), so they require a pregnancy test. Surgeries that are safe for a fetus don't always need a test, and you can choose whether to have one.   If you have a child who's having surgery, please ask for a copy of Preparing for Your Child's Surgery.    Preparing for surgery  1. Within 30 days of surgery: Have a pre-op exam (sometimes called an H&P, or History and Physical). This can be done at a clinic or pre-operative center.  ? If you're having a , you may not need this exam. Talk to your  care team.  2. At your pre-op exam, talk to your care team about all medicines you take. If you need to stop any medicines before surgery, ask when to start taking them again.  ? We do this for your safety. Many medicines can make you bleed too much during surgery. Some change how well surgery (anesthesia) drugs work.  3. Call your insurance company to let them know you're having surgery. (If you don't have insurance, call [phone].)  4. Call your clinic if there's any change in your health. This includes signs of a cold or flu (sore throat, runny nose, cough, rash, fever). It also includes a scrape or scratch near the surgery site.  5. If you have questions on the day of surgery, call your hospital or surgery center.  COVID testing  You may need to be tested for COVID-19 before having surgery. If so, we will give you instructions.  Eating and drinking guidelines  For your safety: Unless your surgeon tells you otherwise, follow the guidelines below.    Eat and drink as usual until 8 hours before surgery. After that, no food or milk.    Drink clear liquids until 2 hours before surgery. These are liquids you can see through, like water, Gatorade and Propel Water. You may also have black coffee and tea (no cream or milk).    Nothing by mouth within 2 hours of surgery. This includes gum, candy and breath mints.    If you drink alcohol: Stop drinking it the night before surgery.    If your care team tells you to take medicine on the morning of surgery, it's okay to take it with a sip of water.  Preventing infection  1. Shower or bathe the night before and morning of your surgery. Follow the instructions your clinic gave you. (If no instructions, use regular soap.)  2. Don't shave or clip hair near your surgery site. We'll remove the hair if needed.  3. Don't smoke or vape the morning of surgery. You may chew nicotine gum up to 2 hours before surgery. A nicotine patch is okay.  ? Note: Some surgeries require you to  completely quit smoking and nicotine. Check with your surgeon.  4. Your care team will make every effort to keep you safe from infection. We will:  ? Clean our hands often with soap and water (or an alcohol-based hand rub).  ? Clean the skin at your surgery site with a special soap that kills germs.  ? Give you a special gown to keep you warm. (Cold raises the risk of infection.)  ? Wear special hair covers, masks, gowns and gloves during surgery.  ? Give antibiotic medicine, if prescribed. Not all surgeries need antibiotics.  What to bring on the day of surgery  1. Photo ID and insurance card  2. Copy of your health care directive, if you have one  3. Glasses and hearing aides (bring cases)  ? You can't wear contacts during surgery  4. Inhaler and eye drops, if you use them (tell us about these when you arrive)  5. CPAP machine or breathing device, if you use them  6. A few personal items, if spending the night  7. If you have . . .  ? A pacemaker, ICD (cardiac defibrillator) or other implant: Bring the ID card.  ? An implanted stimulator: Bring the remote control.  ? A legal guardian: Bring a copy of the certified (court-stamped) guardianship papers.  Please remove any jewelry, including body piercings. Leave jewelry and other valuables at home.  If you're going home the day of surgery    You must have a responsible adult drive you home. They should stay with you overnight as well.    If you don't have someone to stay with you, and you aren't safe to go home alone, we may keep you overnight. Insurance often won't pay for this.  After surgery  If it's hard to control your pain or you need more pain medicine, please call your surgeon's office.  Questions?   If you have any questions for your care team, list them here: _________________________________________________________________________________________________________________________________________________________________________  ____________________________________ ____________________________________ ____________________________________  For informational purposes only. Not to replace the advice of your health care provider. Copyright   2003, 2019 [facility]. All rights reserved. Clinically reviewed by Della Jimenez MD. QuVIS [id number] - REV 07/21.       RECOMMENDATION:  APPROVAL GIVEN to proceed with proposed procedure, without further diagnostic evaluation.    30 minutes spent on the date of the encounter doing chart review, history and exam, documentation and further activities per the note    Subjective     HPI related to upcoming procedure: Patient has a cyst on her left ovary.  They were previously planning on surgical removal of the cyst prior to her last pregnancy however then discovered that she was newly pregnant in their work-up.  Was stable throughout the pregnancy.  Currently scheduled for surgical removal of the left ovary.  Also set up for a tubal ligation.  Patient has pain on the left lower quadrant with pushing on the region.  The cyst has been there for a few years.    Preop Questions 6/9/2022   1. Have you ever had a heart attack or stroke? No   2. Have you ever had surgery on your heart or blood vessels, such as a stent placement, a coronary artery bypass, or surgery on an artery in your head, neck, heart, or legs? No   3. Do you have chest pain with activity? No   4. Do you have a history of  heart failure? No   5. Do you currently have a cold, bronchitis or symptoms of other infection? No   6. Do you have a cough, shortness of breath, or wheezing? No   7. Do you or anyone in your family have previous history of blood clots? No   8. Do you or does anyone in your family have a serious bleeding problem such as prolonged bleeding following surgeries or cuts? No   9. Have you ever had problems with anemia or been told to take iron pills? YES - history of taking iron pills with pregnancy   10. Have you had  any abnormal blood loss such as black, tarry or bloody stools, or abnormal vaginal bleeding? No   11. Have you ever had a blood transfusion? No   12. Are you willing to have a blood transfusion if it is medically needed before, during, or after your surgery? Yes   13. Have you or any of your relatives ever had problems with anesthesia? No   14. Do you have sleep apnea, excessive snoring or daytime drowsiness? No   15. Do you have any artifical heart valves or other implanted medical devices like a pacemaker, defibrillator, or continuous glucose monitor? No   16. Do you have artificial joints? No   17. Are you allergic to latex? No   18. Is there any chance that you may be pregnant? No       Health Care Directive:  Patient does not have a Health Care Directive or Living Will: Discussed advance care planning with patient; information given to patient to review.    Preoperative Review of :   reviewed - no record of controlled substances prescribed.      Status of Chronic Conditions:  Patient is history of allergic rhinitis.  Currently stable with over-the-counter medications as needed.  No acute concerns at this time.    Patient recently has had a rash on her arms bilaterally.  It is a little itchy.  No new foods, medications, lotions, soaps.  No bacterial infection concerns.  No open wound, pus, drainage, warmth, fevers, or chills.  History of rashes with stress in the past.  The rash has been there a couple weeks.  Has tried cortisone 10 over-the-counter which has not helped.    Patient has tolerated surgery well in the past.  Patient has no recent cough or cold symptoms.  No recent fevers, chills, sore throat, ear pain, chest pain, palpitations, problems breathing, stomachaches, nausea, vomiting, diarrhea, constipation, blood in stool or urine, dysuria, frequency, urgency.    Patient can walk up a flight of stairs without becoming short of breath or having chest pain: YES   Patient is able to tolerate greater  than 4 METs of activity without any cardiopulmonary symptoms.      Answers for HPI/ROS submitted by the patient on 6/9/2022  If you checked off any problems, how difficult have these problems made it for you to do your work, take care of things at home, or get along with other people?: Not difficult at all  PHQ9 TOTAL SCORE: 0  KATELYN 7 TOTAL SCORE: 0    Review of Systems  CONSTITUTIONAL: NEGATIVE for fever, chills, change in weight  INTEGUMENTARY/SKIN: NEGATIVE for worrisome rashes, moles or lesions  EYES: NEGATIVE for vision changes or irritation  ENT/MOUTH: NEGATIVE for ear, mouth and throat problems  RESP: NEGATIVE for significant cough or SOB  CV: NEGATIVE for chest pain, palpitations or peripheral edema  GI: NEGATIVE for nausea, abdominal pain, heartburn, or change in bowel habits  : NEGATIVE for frequency, dysuria, or hematuria  MUSCULOSKELETAL: NEGATIVE for significant arthralgias or myalgia  NEURO: NEGATIVE for weakness, dizziness or paresthesias  ENDOCRINE: NEGATIVE for temperature intolerance, skin/hair changes  HEME: NEGATIVE for bleeding problems  PSYCHIATRIC: NEGATIVE for changes in mood or affect    Patient Active Problem List    Diagnosis Date Noted     Allergic rhinitis 05/21/2021     Priority: Medium     Adnexal cyst 04/06/2021     Priority: Medium     Added automatically from request for surgery [id number]       Cyst of right ovary 02/09/2021     Priority: Medium     Added automatically from request for surgery [id number]        Past Medical History:   Diagnosis Date     Contact dermatitis and eczema 05/21/2021    Formatting of this note might be different from the original. Scalp dermatitis, dermatological consult pending.     History of other genital system and obstetric disorders     Premature at 37 weeks with birth weight 5 lbs 15 oz requiring oxygen first 36 hours of life.     Past Surgical History:   Procedure Laterality Date     WISDOM TOOTH EXTRACTION       Current Outpatient Medications    Medication Sig Dispense Refill     Cholecalciferol (VITAMIN D3) 50 MCG (2000 UT) CAPS Take 1 capsule by mouth daily       Cyanocobalamin (B-12) 1000 MCG TBCR        triamcinolone (KENALOG) 0.1 % external ointment Apply topically 2 times daily 80 g 0       Allergies   Allergen Reactions     Food Itching and Swelling     Apples- Gums swell and throat itches  Hazelnuts - Itchy throat     Seasonal Allergies      Penicillins Hives and Rash     Other reaction(s): Throat Swelling/Closing        Social History     Tobacco Use     Smoking status: Never Smoker     Smokeless tobacco: Never Used   Substance Use Topics     Alcohol use: Yes     Comment: Alcoholic Drinks/day: occasional     Family History   Problem Relation Age of Onset     Family History Negative Mother         Good Health     Other - See Comments Father         Psychiatric illness,ADHD and depression     Family History Negative Brother         Good Health     Hypertension Maternal Grandmother         Hypertension     Other - See Comments Maternal Grandfather         Peripheral vascular disease/ of myelodysplasia age 74 and CAD/ age 74 of coronary artery disease     Arthritis Paternal Grandmother         Arthritis     Heart Disease Paternal Grandfather         Heart Disease, of MI age 58     Family History Negative Other         Good Health     Family History Negative Sister         Good Health     History   Drug Use Unknown         Objective     /66   Pulse 80   Temp 98.3  F (36.8  C) (Tympanic)   Resp 16   Wt 69 kg (152 lb 3.2 oz)   SpO2 99%   Breastfeeding No   BMI 24.20 kg/m      Physical Exam    GENERAL APPEARANCE: healthy, alert and no distress     EYES: EOMI, PERRL     HENT: ear canals and TM's normal and nose and mouth without ulcers or lesions     NECK: no adenopathy, no asymmetry, masses, or scars and thyroid normal to palpation     RESP: lungs clear to auscultation - no rales, rhonchi or wheezes     CV: regular rates and  rhythm, normal S1 S2, no S3 or S4 and no murmur, click or rub     ABDOMEN:  soft, nontender, no HSM or masses and bowel sounds normal     MS: extremities normal- no gross deformities noted, no evidence of inflammation in joints, FROM in all extremities.     SKIN: no suspicious lesions.  Minimally erythematous papules appreciated scattered throughout the arms bilaterally.  No open wound, pus, drainage, warmth.     NEURO: Normal strength and tone, sensory exam grossly normal, mentation intact and speech normal     PSYCH: mentation appears normal. and affect normal/bright     LYMPHATICS: No cervical adenopathy    Recent Labs   Lab Test 02/02/22  0505 02/01/22  0606 01/04/22  1442 12/25/21  1222   HGB 10.0* 10.3* 11.0* 10.7*   PLT  --  240 244 225   NA  --   --  134 133*   POTASSIUM  --   --  3.6 3.4*   CR  --   --  0.52* 0.52*        Diagnostics:  Recent Results (from the past 720 hour(s))   Pregnancy, Urine (HCG)    Collection Time: 06/09/22  3:12 PM   Result Value Ref Range    hCG Urine Qualitative Negative Negative   Hemoglobin    Collection Time: 06/09/22  3:12 PM   Result Value Ref Range    Hemoglobin 13.3 11.7 - 15.7 g/dL      No EKG required, no history of coronary heart disease, significant arrhythmia, peripheral arterial disease or other structural heart disease.    Revised Cardiac Risk Index (RCRI):  The patient has the following serious cardiovascular risks for perioperative complications:   - No serious cardiac risks = 0 points     RCRI Interpretation: 0 points: Class I (very low risk - 0.4% complication rate)           Signed Electronically by: Sandy Reza PA-C  Copy of this evaluation report is provided to requesting physician.

## 2022-06-09 NOTE — NURSING NOTE
"Chief Complaint   Patient presents with     Pre-Op Exam     Patient is here for pre-op exam     Initial /66   Pulse 80   Temp 98.3  F (36.8  C) (Tympanic)   Resp 16   Wt 69 kg (152 lb 3.2 oz)   SpO2 99%   Breastfeeding No   BMI 24.20 kg/m   Estimated body mass index is 24.2 kg/m  as calculated from the following:    Height as of 1/4/22: 1.689 m (5' 6.5\").    Weight as of this encounter: 69 kg (152 lb 3.2 oz).  Medication Reconciliation: complete    Shani Ruiz MA       FOOD SECURITY SCREENING QUESTIONS:    The next two questions are to help us understand your food security.  If you are feeling you need any assistance in this area, we have resources available to support you today.    Hunger Vital Signs:  Within the past 12 months we worried whether our food would run out before we got money to buy more. Never  Within the past 12 months the food we bought just didn't last and we didn't have money to get more. Never  Shani Ruiz MA,LPN on 6/9/2022 at 2:42 PM  \    "

## 2022-06-14 ENCOUNTER — ANESTHESIA EVENT (OUTPATIENT)
Dept: SURGERY | Facility: OTHER | Age: 32
End: 2022-06-14
Payer: COMMERCIAL

## 2022-06-15 ENCOUNTER — HOSPITAL ENCOUNTER (OUTPATIENT)
Facility: OTHER | Age: 32
Discharge: HOME | End: 2022-06-15
Attending: STUDENT IN AN ORGANIZED HEALTH CARE EDUCATION/TRAINING PROGRAM | Admitting: STUDENT IN AN ORGANIZED HEALTH CARE EDUCATION/TRAINING PROGRAM
Payer: COMMERCIAL

## 2022-06-15 ENCOUNTER — ANESTHESIA (OUTPATIENT)
Dept: SURGERY | Facility: OTHER | Age: 32
End: 2022-06-15
Payer: COMMERCIAL

## 2022-06-15 VITALS
OXYGEN SATURATION: 98 % | TEMPERATURE: 97.2 F | RESPIRATION RATE: 10 BRPM | BODY MASS INDEX: 24.43 KG/M2 | HEIGHT: 66 IN | WEIGHT: 152 LBS | SYSTOLIC BLOOD PRESSURE: 95 MMHG | DIASTOLIC BLOOD PRESSURE: 72 MMHG | HEART RATE: 56 BPM

## 2022-06-15 DIAGNOSIS — N94.9 ADNEXAL CYST: ICD-10-CM

## 2022-06-15 DIAGNOSIS — N83.201 CYST OF RIGHT OVARY: Primary | ICD-10-CM

## 2022-06-15 LAB
GLUCOSE BLDC GLUCOMTR-MCNC: 84 MG/DL (ref 70–99)
HCG UR QL: NEGATIVE

## 2022-06-15 PROCEDURE — 58662 LAPAROSCOPY EXCISE LESIONS: CPT | Performed by: STUDENT IN AN ORGANIZED HEALTH CARE EDUCATION/TRAINING PROGRAM

## 2022-06-15 PROCEDURE — 250N000009 HC RX 250: Performed by: NURSE ANESTHETIST, CERTIFIED REGISTERED

## 2022-06-15 PROCEDURE — 710N000012 HC RECOVERY PHASE 2, PER MINUTE: Performed by: STUDENT IN AN ORGANIZED HEALTH CARE EDUCATION/TRAINING PROGRAM

## 2022-06-15 PROCEDURE — 710N000010 HC RECOVERY PHASE 1, LEVEL 2, PER MIN: Performed by: STUDENT IN AN ORGANIZED HEALTH CARE EDUCATION/TRAINING PROGRAM

## 2022-06-15 PROCEDURE — 272N000001 HC OR GENERAL SUPPLY STERILE: Performed by: STUDENT IN AN ORGANIZED HEALTH CARE EDUCATION/TRAINING PROGRAM

## 2022-06-15 PROCEDURE — 250N000025 HC SEVOFLURANE, PER MIN: Performed by: STUDENT IN AN ORGANIZED HEALTH CARE EDUCATION/TRAINING PROGRAM

## 2022-06-15 PROCEDURE — 58661 LAPAROSCOPY REMOVE ADNEXA: CPT | Performed by: STUDENT IN AN ORGANIZED HEALTH CARE EDUCATION/TRAINING PROGRAM

## 2022-06-15 PROCEDURE — 258N000003 HC RX IP 258 OP 636: Performed by: NURSE ANESTHETIST, CERTIFIED REGISTERED

## 2022-06-15 PROCEDURE — 360N000077 HC SURGERY LEVEL 4, PER MIN: Performed by: STUDENT IN AN ORGANIZED HEALTH CARE EDUCATION/TRAINING PROGRAM

## 2022-06-15 PROCEDURE — 370N000017 HC ANESTHESIA TECHNICAL FEE, PER MIN: Performed by: STUDENT IN AN ORGANIZED HEALTH CARE EDUCATION/TRAINING PROGRAM

## 2022-06-15 PROCEDURE — 88307 TISSUE EXAM BY PATHOLOGIST: CPT

## 2022-06-15 PROCEDURE — 88302 TISSUE EXAM BY PATHOLOGIST: CPT

## 2022-06-15 PROCEDURE — 58661 LAPAROSCOPY REMOVE ADNEXA: CPT | Performed by: NURSE ANESTHETIST, CERTIFIED REGISTERED

## 2022-06-15 PROCEDURE — 82962 GLUCOSE BLOOD TEST: CPT

## 2022-06-15 PROCEDURE — 250N000011 HC RX IP 250 OP 636: Performed by: NURSE ANESTHETIST, CERTIFIED REGISTERED

## 2022-06-15 PROCEDURE — 250N000011 HC RX IP 250 OP 636: Performed by: STUDENT IN AN ORGANIZED HEALTH CARE EDUCATION/TRAINING PROGRAM

## 2022-06-15 PROCEDURE — 81025 URINE PREGNANCY TEST: CPT | Performed by: STUDENT IN AN ORGANIZED HEALTH CARE EDUCATION/TRAINING PROGRAM

## 2022-06-15 PROCEDURE — 999N000141 HC STATISTIC PRE-PROCEDURE NURSING ASSESSMENT: Performed by: STUDENT IN AN ORGANIZED HEALTH CARE EDUCATION/TRAINING PROGRAM

## 2022-06-15 RX ORDER — ONDANSETRON 2 MG/ML
4 INJECTION INTRAMUSCULAR; INTRAVENOUS EVERY 30 MIN PRN
Status: DISCONTINUED | OUTPATIENT
Start: 2022-06-15 | End: 2022-06-15 | Stop reason: HOSPADM

## 2022-06-15 RX ORDER — ACETAMINOPHEN 325 MG/1
975 TABLET ORAL ONCE
Status: DISCONTINUED | OUTPATIENT
Start: 2022-06-15 | End: 2022-06-15 | Stop reason: HOSPADM

## 2022-06-15 RX ORDER — SODIUM CHLORIDE, SODIUM LACTATE, POTASSIUM CHLORIDE, CALCIUM CHLORIDE 600; 310; 30; 20 MG/100ML; MG/100ML; MG/100ML; MG/100ML
INJECTION, SOLUTION INTRAVENOUS CONTINUOUS
Status: DISCONTINUED | OUTPATIENT
Start: 2022-06-15 | End: 2022-06-15 | Stop reason: HOSPADM

## 2022-06-15 RX ORDER — NALOXONE HYDROCHLORIDE 0.4 MG/ML
0.2 INJECTION, SOLUTION INTRAMUSCULAR; INTRAVENOUS; SUBCUTANEOUS
Status: DISCONTINUED | OUTPATIENT
Start: 2022-06-15 | End: 2022-06-15 | Stop reason: HOSPADM

## 2022-06-15 RX ORDER — IBUPROFEN 200 MG
800 TABLET ORAL ONCE
Status: DISCONTINUED | OUTPATIENT
Start: 2022-06-15 | End: 2022-06-15 | Stop reason: HOSPADM

## 2022-06-15 RX ORDER — FENTANYL CITRATE 50 UG/ML
INJECTION, SOLUTION INTRAMUSCULAR; INTRAVENOUS PRN
Status: DISCONTINUED | OUTPATIENT
Start: 2022-06-15 | End: 2022-06-15

## 2022-06-15 RX ORDER — PHENYLEPHRINE HYDROCHLORIDE 10 MG/ML
INJECTION INTRAVENOUS PRN
Status: DISCONTINUED | OUTPATIENT
Start: 2022-06-15 | End: 2022-06-15

## 2022-06-15 RX ORDER — ONDANSETRON 4 MG/1
4 TABLET, ORALLY DISINTEGRATING ORAL EVERY 30 MIN PRN
Status: DISCONTINUED | OUTPATIENT
Start: 2022-06-15 | End: 2022-06-15 | Stop reason: HOSPADM

## 2022-06-15 RX ORDER — FENTANYL CITRATE 50 UG/ML
50 INJECTION, SOLUTION INTRAMUSCULAR; INTRAVENOUS EVERY 5 MIN PRN
Status: DISCONTINUED | OUTPATIENT
Start: 2022-06-15 | End: 2022-06-15 | Stop reason: HOSPADM

## 2022-06-15 RX ORDER — MEPERIDINE HYDROCHLORIDE 50 MG/ML
12.5 INJECTION INTRAMUSCULAR; INTRAVENOUS; SUBCUTANEOUS
Status: DISCONTINUED | OUTPATIENT
Start: 2022-06-15 | End: 2022-06-15 | Stop reason: HOSPADM

## 2022-06-15 RX ORDER — OXYCODONE HYDROCHLORIDE 5 MG/1
5-10 TABLET ORAL EVERY 6 HOURS PRN
Qty: 10 TABLET | Refills: 0 | Status: SHIPPED | OUTPATIENT
Start: 2022-06-15 | End: 2023-11-01

## 2022-06-15 RX ORDER — FENTANYL CITRATE 50 UG/ML
50 INJECTION, SOLUTION INTRAMUSCULAR; INTRAVENOUS
Status: DISCONTINUED | OUTPATIENT
Start: 2022-06-15 | End: 2022-06-15 | Stop reason: HOSPADM

## 2022-06-15 RX ORDER — ONDANSETRON 4 MG/1
4 TABLET, ORALLY DISINTEGRATING ORAL EVERY 8 HOURS PRN
Qty: 10 TABLET | Refills: 0 | Status: SHIPPED | OUTPATIENT
Start: 2022-06-15 | End: 2023-11-01

## 2022-06-15 RX ORDER — GLYCOPYRROLATE 0.2 MG/ML
INJECTION, SOLUTION INTRAMUSCULAR; INTRAVENOUS PRN
Status: DISCONTINUED | OUTPATIENT
Start: 2022-06-15 | End: 2022-06-15

## 2022-06-15 RX ORDER — NALOXONE HYDROCHLORIDE 0.4 MG/ML
0.4 INJECTION, SOLUTION INTRAMUSCULAR; INTRAVENOUS; SUBCUTANEOUS
Status: DISCONTINUED | OUTPATIENT
Start: 2022-06-15 | End: 2022-06-15 | Stop reason: HOSPADM

## 2022-06-15 RX ORDER — SODIUM CHLORIDE, SODIUM LACTATE, POTASSIUM CHLORIDE, CALCIUM CHLORIDE 600; 310; 30; 20 MG/100ML; MG/100ML; MG/100ML; MG/100ML
INJECTION, SOLUTION INTRAVENOUS CONTINUOUS PRN
Status: DISCONTINUED | OUTPATIENT
Start: 2022-06-15 | End: 2022-06-15

## 2022-06-15 RX ORDER — ONDANSETRON 2 MG/ML
INJECTION INTRAMUSCULAR; INTRAVENOUS PRN
Status: DISCONTINUED | OUTPATIENT
Start: 2022-06-15 | End: 2022-06-15

## 2022-06-15 RX ORDER — BUPIVACAINE HYDROCHLORIDE 2.5 MG/ML
INJECTION, SOLUTION INFILTRATION; PERINEURAL PRN
Status: DISCONTINUED | OUTPATIENT
Start: 2022-06-15 | End: 2022-06-15 | Stop reason: HOSPADM

## 2022-06-15 RX ORDER — KETOROLAC TROMETHAMINE 30 MG/ML
INJECTION, SOLUTION INTRAMUSCULAR; INTRAVENOUS PRN
Status: DISCONTINUED | OUTPATIENT
Start: 2022-06-15 | End: 2022-06-15

## 2022-06-15 RX ORDER — LIDOCAINE 40 MG/G
CREAM TOPICAL
Status: DISCONTINUED | OUTPATIENT
Start: 2022-06-15 | End: 2022-06-15 | Stop reason: HOSPADM

## 2022-06-15 RX ORDER — OXYCODONE HYDROCHLORIDE 5 MG/1
5 TABLET ORAL EVERY 4 HOURS PRN
Status: DISCONTINUED | OUTPATIENT
Start: 2022-06-15 | End: 2022-06-15 | Stop reason: HOSPADM

## 2022-06-15 RX ORDER — OXYCODONE HYDROCHLORIDE 5 MG/1
5 TABLET ORAL
Status: DISCONTINUED | OUTPATIENT
Start: 2022-06-15 | End: 2022-06-15 | Stop reason: HOSPADM

## 2022-06-15 RX ORDER — PROPOFOL 10 MG/ML
INJECTION, EMULSION INTRAVENOUS PRN
Status: DISCONTINUED | OUTPATIENT
Start: 2022-06-15 | End: 2022-06-15

## 2022-06-15 RX ORDER — HYDROMORPHONE HYDROCHLORIDE 1 MG/ML
0.4 INJECTION, SOLUTION INTRAMUSCULAR; INTRAVENOUS; SUBCUTANEOUS EVERY 5 MIN PRN
Status: DISCONTINUED | OUTPATIENT
Start: 2022-06-15 | End: 2022-06-15 | Stop reason: HOSPADM

## 2022-06-15 RX ORDER — LIDOCAINE HYDROCHLORIDE 20 MG/ML
INJECTION, SOLUTION INFILTRATION; PERINEURAL PRN
Status: DISCONTINUED | OUTPATIENT
Start: 2022-06-15 | End: 2022-06-15

## 2022-06-15 RX ORDER — PROPOFOL 10 MG/ML
INJECTION, EMULSION INTRAVENOUS CONTINUOUS PRN
Status: DISCONTINUED | OUTPATIENT
Start: 2022-06-15 | End: 2022-06-15

## 2022-06-15 RX ORDER — DEXAMETHASONE SODIUM PHOSPHATE 4 MG/ML
INJECTION, SOLUTION INTRA-ARTICULAR; INTRALESIONAL; INTRAMUSCULAR; INTRAVENOUS; SOFT TISSUE PRN
Status: DISCONTINUED | OUTPATIENT
Start: 2022-06-15 | End: 2022-06-15

## 2022-06-15 RX ADMIN — PROPOFOL 200 MCG/KG/MIN: 10 INJECTION, EMULSION INTRAVENOUS at 07:31

## 2022-06-15 RX ADMIN — ROCURONIUM BROMIDE 25 MG: 50 INJECTION, SOLUTION INTRAVENOUS at 07:31

## 2022-06-15 RX ADMIN — KETOROLAC TROMETHAMINE 30 MG: 30 INJECTION, SOLUTION INTRAMUSCULAR at 07:40

## 2022-06-15 RX ADMIN — PHENYLEPHRINE HYDROCHLORIDE 100 MCG: 10 INJECTION INTRAVENOUS at 07:48

## 2022-06-15 RX ADMIN — PROPOFOL 200 MG: 10 INJECTION, EMULSION INTRAVENOUS at 07:31

## 2022-06-15 RX ADMIN — MIDAZOLAM HYDROCHLORIDE 2 MG: 1 INJECTION, SOLUTION INTRAMUSCULAR; INTRAVENOUS at 07:29

## 2022-06-15 RX ADMIN — SODIUM CHLORIDE, SODIUM LACTATE, POTASSIUM CHLORIDE, AND CALCIUM CHLORIDE: 600; 310; 30; 20 INJECTION, SOLUTION INTRAVENOUS at 07:29

## 2022-06-15 RX ADMIN — GLYCOPYRROLATE 0.2 MG: 0.2 INJECTION, SOLUTION INTRAMUSCULAR; INTRAVENOUS at 07:52

## 2022-06-15 RX ADMIN — LIDOCAINE HYDROCHLORIDE 0.1 ML: 10 INJECTION, SOLUTION EPIDURAL; INFILTRATION; INTRACAUDAL; PERINEURAL at 07:25

## 2022-06-15 RX ADMIN — SUGAMMADEX 200 MG: 100 INJECTION, SOLUTION INTRAVENOUS at 08:18

## 2022-06-15 RX ADMIN — FENTANYL CITRATE 100 MCG: 50 INJECTION, SOLUTION INTRAMUSCULAR; INTRAVENOUS at 07:31

## 2022-06-15 RX ADMIN — SODIUM CHLORIDE, POTASSIUM CHLORIDE, SODIUM LACTATE AND CALCIUM CHLORIDE 100 ML/HR: 600; 310; 30; 20 INJECTION, SOLUTION INTRAVENOUS at 07:25

## 2022-06-15 RX ADMIN — ONDANSETRON HYDROCHLORIDE 4 MG: 2 SOLUTION INTRAMUSCULAR; INTRAVENOUS at 07:31

## 2022-06-15 RX ADMIN — LIDOCAINE HYDROCHLORIDE 100 MG: 20 INJECTION, SOLUTION INFILTRATION; PERINEURAL at 07:31

## 2022-06-15 RX ADMIN — DEXAMETHASONE SODIUM PHOSPHATE 8 MG: 4 INJECTION, SOLUTION INTRAMUSCULAR; INTRAVENOUS at 07:31

## 2022-06-15 NOTE — ANESTHESIA PREPROCEDURE EVALUATION
Anesthesia Pre-Procedure Evaluation    Patient: Rowena Mcqueen   MRN: [id number] : 1990        Procedure : Procedure(s):  EXCISION, CYST, BENIGN, Left  OVARY, LAPAROSCOPIC, bilateral salpingectomy          Past Medical History:   Diagnosis Date     Contact dermatitis and eczema 2021    Formatting of this note might be different from the original. Scalp dermatitis, dermatological consult pending.     History of other genital system and obstetric disorders     Premature at 37 weeks with birth weight 5 lbs 15 oz requiring oxygen first 36 hours of life.      Past Surgical History:   Procedure Laterality Date     WISDOM TOOTH EXTRACTION        Allergies   Allergen Reactions     Food Itching and Swelling     Apples- Gums swell and throat itches  Hazelnuts - Itchy throat     Seasonal Allergies      Penicillins Hives and Rash     Other reaction(s): Throat Swelling/Closing      Social History     Tobacco Use     Smoking status: Never Smoker     Smokeless tobacco: Never Used   Substance Use Topics     Alcohol use: Yes     Comment: Alcoholic Drinks/day: occasional      Wt Readings from Last 1 Encounters:   22 69 kg (152 lb 3.2 oz)        Anesthesia Evaluation   Pt has had prior anesthetic. Type: MAC.    No history of anesthetic complications       ROS/MED HX  ENT/Pulmonary:  - neg pulmonary ROS     Neurologic:  - neg neurologic ROS     Cardiovascular:  - neg cardiovascular ROS     METS/Exercise Tolerance: >4 METS    Hematologic:     (+) anemia,     Musculoskeletal:  - neg musculoskeletal ROS     GI/Hepatic:  - neg GI/hepatic ROS     Renal/Genitourinary:  - neg Renal ROS     Endo:  - neg endo ROS     Psychiatric/Substance Use:  - neg psychiatric ROS     Infectious Disease:  - neg infectious disease ROS     Malignancy:  - neg malignancy ROS     Other:  - neg other ROS          Physical Exam    Airway        Mallampati: I   TM distance: > 3 FB   Neck ROM: full   Mouth opening: > 3 cm    Respiratory  Devices and Support         Dental  no notable dental history         Cardiovascular   cardiovascular exam normal       Rhythm and rate: regular and normal     Pulmonary   pulmonary exam normal        breath sounds clear to auscultation           OUTSIDE LABS:  CBC:   Lab Results   Component Value Date    WBC 6.4 02/01/2022    WBC 6.3 01/04/2022    HGB 13.3 06/09/2022    HGB 10.0 (L) 02/02/2022    HCT 30.7 (L) 02/01/2022    HCT 32.0 (L) 01/04/2022     02/01/2022     01/04/2022     BMP:   Lab Results   Component Value Date     01/04/2022     (L) 12/25/2021    POTASSIUM 3.6 01/04/2022    POTASSIUM 3.4 (L) 12/25/2021    CHLORIDE 102 01/04/2022    CHLORIDE 102 12/25/2021    CO2 23 01/04/2022    CO2 23 12/25/2021    BUN 7 01/04/2022    BUN 5 (L) 12/25/2021    CR 0.52 (L) 01/04/2022    CR 0.52 (L) 12/25/2021    GLC 76 01/04/2022    GLC 84 12/25/2021     COAGS: No results found for: PTT, INR, FIBR  POC:   Lab Results   Component Value Date    HCG Negative 06/09/2022     HEPATIC:   Lab Results   Component Value Date    ALBUMIN 3.6 01/04/2022    PROTTOTAL 6.6 01/04/2022    ALT 47 01/04/2022    AST 23 01/04/2022    ALKPHOS 119 (H) 01/04/2022    BILITOTAL 0.8 01/04/2022     OTHER:   Lab Results   Component Value Date    REHANA 8.9 01/04/2022       Anesthesia Plan    ASA Status:  1   NPO Status:  NPO Appropriate    Anesthesia Type: General.     - Airway: ETT   Induction: Propofol, Intravenous.   Maintenance: TIVA.        Consents    Anesthesia Plan(s) and associated risks, benefits, and realistic alternatives discussed. Questions answered and patient/representative(s) expressed understanding.     - Discussed: Risks, Benefits and Alternatives for BOTH SEDATION and the PROCEDURE were discussed     - Discussed with:  Patient      - Extended Intubation/Ventilatory Support Discussed: No.      - Patient is DNR/DNI Status: No    Use of blood products discussed: No .     Postoperative Care    Pain management:  Oral pain medications, IV analgesics.   PONV prophylaxis: Ondansetron (or other 5HT-3), Dexamethasone or Solumedrol, Background Propofol Infusion     Comments:                Jay Earl CRNA, APRN CRNA

## 2022-06-15 NOTE — ANESTHESIA CARE TRANSFER NOTE
Patient: Rowena Mcqueen    Procedure: Procedure(s):  EXCISION, CYST, BENIGN, Left  OVARY, LAPAROSCOPIC, bilateral salpingectomy       Diagnosis: Cyst of ovary, unspecified laterality [N83.209]  Diagnosis Additional Information: No value filed.    Anesthesia Type:   General     Note:    Oropharynx: oropharynx clear of all foreign objects and spontaneously breathing  Level of Consciousness: drowsy  Oxygen Supplementation: face mask  Level of Supplemental Oxygen (L/min / FiO2): 6  Independent Airway: airway patency satisfactory and stable  Dentition: dentition unchanged  Vital Signs Stable: post-procedure vital signs reviewed and stable  Report to RN Given: handoff report given  Patient transferred to: PACU    Handoff Report: Identifed the Patient, Identified the Reponsible Provider, Reviewed the pertinent medical history, Discussed the surgical course, Reviewed Intra-OP anesthesia mangement and issues during anesthesia, Set expectations for post-procedure period and Allowed opportunity for questions and acknowledgement of understanding      Vitals:  Vitals Value Taken Time   BP     Temp     Pulse     Resp     SpO2         Electronically Signed By: Jay Earl CRNA, APRN CRNA  Radha 15, 2022  8:39 AM

## 2022-06-15 NOTE — OP NOTE
"Ovarian cystectomy and Bilateral Salpingectomy Operative Report    Preop Dx:   1. Left ovarian cyst: 8 cm  2. Desires permanent sterilization    Postop Dx: Same    Procedure:   1. Exam under anesthesia  2. Diagnostic laparoscopy  3. Bilateral salpingectomy  4. Left ovarian cystectomy    Surgeon: Dr. Elsa Rene    Anesthesia: General    Complications: none    EBL: 10 mL   IVF:  600 mL  UOP: 200 mL, clear, drained before start of procedure    Findings: Approximately 7 cm anteverted uterus, normal appearing bilateral fallopian tubes. Normal appearing liver edge. Left ovary with 8 cm simple characteristic cyst noted to be tucked down into the posterior cul de sac.    Pathology: Bilateral fallopian tubes, left ovarian cyst    Procedure:   I met with  in the pre-operative area where the procedure was once again described in detail. She had a chance to ask any remaining questions and appropriate consents for the procedure were signed/placed in her patient chart. She was then taken to the OR, placed in a dorsal-supine position where general anesthesia was administered without difficulty. She was then put in a dorsal-lithotomy position and her legs were placed in Bob stirrups. SCDs were noted to be plugged in and working. An exam under anesthesia was performed to reveal a mobile uterus with fullness on left pelvic exam. She was then prepped and draped in the normal sterile fashion. A formal operating room time-out was performed to confirm the patient, procedure, appropriate set-up, allergies and anticipated course do the surgery. All operating room attendees were in agreement.      A 10 mm skin incision was made in the infraumbilical region. The fascia was grasped with a kocher clamp and elevated. A Veress needle was inserted thorugh the elevated fascia until the characteristic \"clicks\" were noted and the intraabdominal position was confirmed by low opening pressure. Insufflation and pneumoperitoneum was " created to a set pressure of 15mmHg.  A 10 mm trocar was introduced under direct visualization without difficulty. The region directly under the port placement was inspected before any patient movement to ensure entry was safe and without injury to underling bowel or vessels. There was no sign of injury. The patient bed was transitioned into a trendelenburg position to allow visualization of the pelvic anatomy. Panoramic laparoscopy revealed the findings noted above.     Using direct visualization from within, a second 5mm port was placed in the left lower quadrant without difficulty, using the ASIS as a landmark and ensuring 2 cm superior and 2 cm medial. Superficial vessels were avoided with use of skin illumination from the laparoscopic light underneath. A final 5 mm port was placed in triangulation from the first two ports on the left side for surgical approach, again avoiding superficial vascularity.    Thorough examination of the pelvic anatomy with laparoscopic instruments revealed the pelvic anatomy noted above. Starting on the patient's right side the fallopian tube was followed out to the fimbriated end to confirm anatomy. A LigaSure device was used to perform the salpingectomy with great care to ensure the heat from the device was far from any bowel or other structures. The fallopian tube was then removed without difficulty through the 5mm port. Attention was then turned to the patient's left side where the ovary was examined. The ureter was noted to be far out of the operative zone and a left ovarian cystectomy was performed utilizing the ligasure device and monopolar hook. The remaining normal portion of the ovary was left intact and cyst wall removed through the 10mm port.     Lastly the fallopian tube on the left side was removed with the Ligasure device. An inspection of the pelvic anatomy once more confirmed hemostasis. All instruments were removed and the pneumoperitoneum was released. The fascia  surrounding the 10 mm umbilical incision was closed with Ricky-Albert device and O-vicryl suture. All ports were removed. The skin incisions were closed with 4-0 moncryl and skin glue.    She was cleaned off, all of the instrument, sharps and soft counts were correct.  She tolerated the procedure well and was moved to the PACU in stable condition.

## 2022-06-15 NOTE — DISCHARGE INSTRUCTIONS
[facility]  Adult Discharge Orders & Instructions      For 24 hours after surgery:  Get plenty of rest.  A responsible adult must stay with you for at least 24 hours after you leave the hospital.   You may feel lightheaded.  IF so, sit for a few minutes before standing.  Have someone help you get up.   You may have a slight fever. Call the doctor if your fever is over 101 F (38.3 C) (taken under the tongue) or lasts longer than 24 hours.  You may have a dry mouth, a sore throat, muscle aches or trouble sleeping.  These should go away after 24 hours.  Do not make important or legal decisions.  6.   Do not drive or use heavy equipment.  If you have weakness or tingling, don't drive or use heavy equipment until this feeling goes away.                                                                                                                                                                           To contact a doctor, call    [phone]______________

## 2022-06-15 NOTE — OR NURSING
PACU Transfer Note    Rowena Mcqueen was transferred to 730 via cart.  Equipment used for transport:  none.  Accompanied by:  RN  Prescriptions were: e-scribed to pharmacy    PACU Respiratory Event Documentation     1) Episodes of Apnea greater than or equal to 10 seconds: no    2) Bradypnea - less than 8 breaths per minute: no    3) Pain score on 0 to 10 scale: 0/10    4) Pain-sedation mismatch (yes or no): no    5) Repeated 02 desaturation less than 90% (yes or no): no    Anesthesia notified? (yes or no): no    Any of the above events occuring repeatedly in separate 30 minute intervals may be considered recurrent PACU respiratory events.    Patient stable and meets phase 1 discharge criteria for transport from PACU.

## 2022-06-15 NOTE — ANESTHESIA POSTPROCEDURE EVALUATION
Patient: Rowena Mcqueen    Procedure: Procedure(s):  EXCISION, CYST, BENIGN, Left  OVARY, LAPAROSCOPIC, bilateral salpingectomy       Anesthesia Type:  General    Note:  Disposition: Outpatient   Postop Pain Control:            Sign Out: Well controlled pain   PONV: No   Neuro/Psych:             Sign Out: Acceptable/Baseline neuro status   Airway/Respiratory:             Sign Out: Acceptable/Baseline resp. status   CV/Hemodynamics:             Sign Out: Acceptable CV status   Other NRE: NONE   DID A NON-ROUTINE EVENT OCCUR? No           Last vitals:  Vitals Value Taken Time   BP 94/64 06/15/22 0910   Temp 97.1  F (36.2  C) 06/15/22 0910   Pulse 72 06/15/22 0912   Resp 9 06/15/22 0912   SpO2 96 % 06/15/22 0912   Vitals shown include unvalidated device data.    Electronically Signed By: David Kellerman, APRN CRNA  Radha 15, 2022  10:49 AM

## 2022-06-15 NOTE — OR NURSING
Patient has been discharged to home at 1030 via ambulatory accompanied by RN    Written discharge instructions were provided to patient.  Prescriptions were picked up from [facility].  Patient states their pain is 3/10 cramping and tolerable, and denies any nausea or dizziness upon discharge.    Patient and adult caring for them verbalize understanding of discharge instructions including no driving until tomorrow and no longer taking narcotic pain medications - no operating mechanical equipment and no making any important decisions.They understand reason for discharge, and necessary follow-up appointments.      Tonia Rodriguez RN

## 2022-06-15 NOTE — INTERVAL H&P NOTE
"I have reviewed the surgical (or preoperative) H&P that is linked to this encounter, and examined the patient. There are no significant changes    We went over the procedure in detail again this morning and all questions were answered. Paper consents were signed to state \"unilateral ovarian cystectomy\" in the event that US read is off and the cyst is on the right ovary instead of left when we enter the abdomen.    LUCIANO GARCIA MD on 6/15/2022 at 7:16 AM    Clinical Conditions Present on Arrival:  Clinically Significant Risk Factors Present on Admission                        "

## 2022-06-20 LAB
PATH REPORT.COMMENTS IMP SPEC: NORMAL
PATH REPORT.FINAL DX SPEC: NORMAL
PATH REPORT.RELEVANT HX SPEC: NORMAL
PHOTO IMAGE: NORMAL

## 2022-06-22 ENCOUNTER — MEDICAL CORRESPONDENCE (OUTPATIENT)
Dept: HEALTH INFORMATION MANAGEMENT | Facility: OTHER | Age: 32
End: 2022-06-22

## 2022-06-28 NOTE — NURSING NOTE
"Chief Complaint   Patient presents with     Cyst     left ovary needs U/S     And check vitamin D level    Initial /66   Pulse 82   Temp 98.1  F (36.7  C) (Temporal)   Resp 16   Ht 1.676 m (5' 6\")   Wt 69.2 kg (152 lb 9.6 oz)   LMP 06/12/2020   SpO2 96%   BMI 24.63 kg/m   Estimated body mass index is 24.63 kg/m  as calculated from the following:    Height as of this encounter: 1.676 m (5' 6\").    Weight as of this encounter: 69.2 kg (152 lb 9.6 oz).    Medication Reconciliation: complete      Norma J. Gosselin, LPN  "
As certified below, I, or a nurse practitioner or physician assistant working with me, had a face-to-face encounter that meets the physician face-to-face encounter requirements.

## 2022-06-29 ENCOUNTER — OFFICE VISIT (OUTPATIENT)
Dept: OBGYN | Facility: OTHER | Age: 32
End: 2022-06-29
Attending: STUDENT IN AN ORGANIZED HEALTH CARE EDUCATION/TRAINING PROGRAM
Payer: COMMERCIAL

## 2022-06-29 VITALS
DIASTOLIC BLOOD PRESSURE: 70 MMHG | WEIGHT: 149.7 LBS | TEMPERATURE: 97.2 F | HEART RATE: 74 BPM | BODY MASS INDEX: 24.16 KG/M2 | SYSTOLIC BLOOD PRESSURE: 98 MMHG

## 2022-06-29 DIAGNOSIS — N83.209 CYST OF OVARY, UNSPECIFIED LATERALITY: Primary | ICD-10-CM

## 2022-06-29 DIAGNOSIS — Z98.890 HISTORY OF LAPAROSCOPY: ICD-10-CM

## 2022-06-29 PROCEDURE — 99024 POSTOP FOLLOW-UP VISIT: CPT | Performed by: STUDENT IN AN ORGANIZED HEALTH CARE EDUCATION/TRAINING PROGRAM

## 2022-06-29 RX ORDER — PRENATAL VIT/IRON FUM/FOLIC AC 27MG-0.8MG
1 TABLET ORAL DAILY
COMMUNITY
End: 2023-11-01

## 2022-06-29 NOTE — PROGRESS NOTES
Postoperative Visit  2022    S: Rowena Mcqueen is a 31 year old  here for post-operative visit following LSC ovarian cystectomy and bilateral salpingectomy on 6/15/2022. She reports feeling well. Her pain has resolved after a few days as well as her energy has returned. We reviewed the surgical photos and benign pathology report    O:   BP 98/70   Pulse 74   Temp 97.2  F (36.2  C) (Tympanic)   Wt 67.9 kg (149 lb 11.2 oz)   LMP 06/15/2022   BMI 24.16 kg/m    Gen:  Well-appearing, NAD  Abd: soft, non-tender, skin glue still present, instructed to peel off in the bath. No peritoneal signs or swelling. No sign of incisional infection.  Pelvic: deferred as she is feeling well    Pathology:  Benign serous cystadenoma  Benign fallopian tubes    A/P:   Ms. Rowena Mcqueen is a 31 year old  with history of ovarian cyst s/p laparoscopic ovarian cystectomy and bilateral salpingectomy. Doing well, no concerns    She will return to clinic with any concerns or questions  LUCIANO GARCIA MD on 2022 at 12:13 PM

## 2022-06-29 NOTE — NURSING NOTE
Pt presents to clinic today for 2 week post-op. Pt states she is doing good and has no concerns at this time.      Medication Reconciliation: complete  Cori Thayer LPN

## 2022-09-17 ENCOUNTER — HEALTH MAINTENANCE LETTER (OUTPATIENT)
Age: 32
End: 2022-09-17

## 2022-11-22 ENCOUNTER — OFFICE VISIT (OUTPATIENT)
Dept: FAMILY MEDICINE | Facility: OTHER | Age: 32
End: 2022-11-22
Attending: NURSE PRACTITIONER
Payer: COMMERCIAL

## 2022-11-22 VITALS
DIASTOLIC BLOOD PRESSURE: 74 MMHG | HEART RATE: 86 BPM | TEMPERATURE: 97.9 F | SYSTOLIC BLOOD PRESSURE: 120 MMHG | WEIGHT: 149 LBS | OXYGEN SATURATION: 96 % | BODY MASS INDEX: 24.05 KG/M2

## 2022-11-22 DIAGNOSIS — J02.9 VIRAL PHARYNGITIS: Primary | ICD-10-CM

## 2022-11-22 DIAGNOSIS — J02.9 SORE THROAT: ICD-10-CM

## 2022-11-22 LAB — GROUP A STREP BY PCR: NOT DETECTED

## 2022-11-22 PROCEDURE — 87651 STREP A DNA AMP PROBE: CPT | Mod: ZL | Performed by: NURSE PRACTITIONER

## 2022-11-22 PROCEDURE — 99213 OFFICE O/P EST LOW 20 MIN: CPT | Performed by: NURSE PRACTITIONER

## 2022-11-22 ASSESSMENT — PAIN SCALES - GENERAL: PAINLEVEL: MODERATE PAIN (5)

## 2022-11-22 NOTE — NURSING NOTE
"Chief Complaint   Patient presents with     Pharyngitis     Patient here for sore throat and headache that started yesterday.   Initial /74   Pulse 86   Temp 97.9  F (36.6  C)   Wt 67.6 kg (149 lb)   SpO2 96%   BMI 24.05 kg/m   Estimated body mass index is 24.05 kg/m  as calculated from the following:    Height as of 6/15/22: 1.676 m (5' 6\").    Weight as of this encounter: 67.6 kg (149 lb).  Medication Reconciliation: complete          Deepa Vaughn, GABRIELLA  "

## 2022-11-22 NOTE — PROGRESS NOTES
ASSESSMENT/PLAN:     I have reviewed the nursing notes.  I have reviewed the findings, diagnosis, plan and need for follow up with the patient.      1. Sore throat    - Group A Streptococcus PCR Throat Swab    2. Viral pharyngitis      Negative strep PCR test today    Discussed with patient that symptoms and exam are consistent with viral illness.    No clinical indications for antibiotic treatment at this time.    Symptomatic treatment - Encouraged fluids, salt water gargles, honey, elevation, humidifier, lozenges, tea, soup, smoothies, popsicles, etc     May use over-the-counter Tylenol or ibuprofen PRN    Discussed warning signs/symptoms indicative of need to f/u  Follow up if symptoms persist or worsen or concerns      I explained my diagnostic considerations and recommendations to the patient, who voiced understanding and agreement with the treatment plan. All questions were answered. We discussed potential side effects of any prescribed or recommended therapies, as well as expectations for response to treatments.    Dorothy Pham NP  [facility]      SUBJECTIVE:   Rowena Mcqueen is a 32 year old female who presents to clinic today for the following health issues:  Strep test    HPI  Requesting strep test as her son was exposed to strep at school.  Also 2 of her 3 sons tested positive for strep, one yesterday and one today.   Woke up yesterday morning with headache and sore throat.    No fevers.  Mild fullness in ears, no pain.  Mild nasal congestion at night.   No cough.  No shortness of breath.  Appetite decreased today.  Energy decreased due to infant not sleeping well.  No over the counter medications  They have family flying in for Thanksgiving from [place] tomorrow.          Past Medical History:   Diagnosis Date     Contact dermatitis and eczema 05/21/2021    Formatting of this note might be different from the original. Scalp dermatitis, dermatological consult pending.      History of other genital system and obstetric disorders     Premature at 37 weeks with birth weight 5 lbs 15 oz requiring oxygen first 36 hours of life.     Past Surgical History:   Procedure Laterality Date     LAPAROSCOPIC CYSTECTOMY OVARIAN (BENIGN) N/A 6/15/2022    Procedure: EXCISION, CYST, BENIGN, Left  OVARY, LAPAROSCOPIC, bilateral salpingectomy;  Surgeon: Deepa Hewitt MD;  Location: [facility] OR     WISDOM TOOTH EXTRACTION       Social History     Tobacco Use     Smoking status: Never     Smokeless tobacco: Never   Substance Use Topics     Alcohol use: Yes     Comment: Alcoholic Drinks/day: occasional     Current Outpatient Medications   Medication Sig Dispense Refill     Cholecalciferol (VITAMIN D3) 50 MCG (2000 UT) CAPS Take 1 capsule by mouth daily (Patient not taking: Reported on 11/22/2022)       Cyanocobalamin (B-12) 1000 MCG TBCR  (Patient not taking: Reported on 11/22/2022)       ondansetron (ZOFRAN ODT) 4 MG ODT tab Take 1 tablet (4 mg) by mouth every 8 hours as needed for nausea (Patient not taking: Reported on 11/22/2022) 10 tablet 0     oxyCODONE (ROXICODONE) 5 MG tablet Take 1-2 tablets (5-10 mg) by mouth every 6 hours as needed for moderate to severe pain or breakthrough pain (Patient not taking: Reported on 11/22/2022) 10 tablet 0     Prenatal Vit-Fe Fumarate-FA (PRENATAL MULTIVITAMIN W/IRON) 27-0.8 MG tablet Take 1 tablet by mouth daily (Patient not taking: Reported on 11/22/2022)       triamcinolone (KENALOG) 0.1 % external ointment Apply topically 2 times daily (Patient not taking: Reported on 11/22/2022) 80 g 0     Allergies   Allergen Reactions     Food Itching and Swelling     Apples- Gums swell and throat itches  Hazelnuts - Itchy throat     Seasonal Allergies      Penicillins Hives and Rash     Other reaction(s): Throat Swelling/Closing         Past medical history, past surgical history, current medications and allergies reviewed and accurate to the best of my knowledge.         OBJECTIVE:     /74   Pulse 86   Temp 97.9  F (36.6  C)   Wt 67.6 kg (149 lb)   SpO2 96%   BMI 24.05 kg/m    Body mass index is 24.05 kg/m .     Physical Exam  General Appearance: Well appearing adult female, non ill appearance, appropriate appearance for age. No acute distress  Ears: Left TM intact with bony landmarks appreciated, no erythema, no effusion, no bulging, no purulence.  Right TM intact with bony landmarks appreciated, no erythema, no effusion, no bulging, no purulence.  Left auditory canal clear without drainage or bleeding.  Right auditory canal clear without drainage or bleeding.  Normal external ears, non tender.  Eyes: conjunctivae normal without erythema or irritation, corneas clear, no drainage or crusting, no eyelid swelling, pupils equal   Orophayrnx: moist mucous membranes, pharynx with mild erythema, tonsils without hypertrophy, tonsils with mild erythema, no tonsillar exudates, no oral lesions, no palate petechiae, no post nasal drip seen, no trismus, voice clear.    Nose:  No noted drainage or congestion   Neck: supple without adenopathy  Respiratory: normal chest wall and respirations.  Normal effort.  No cough appreciated.  Cardiac: RRR with no murmurs  Musculoskeletal:  Equal movement of bilateral upper extremities.  Equal movement of bilateral lower extremities.  Normal gait.    Psychological: normal affect, alert, oriented, and pleasant.       Labs:  Results for orders placed or performed in visit on 11/22/22   Group A Streptococcus PCR Throat Swab     Status: Normal    Specimen: Throat; Swab   Result Value Ref Range    Group A strep by PCR Not Detected Not Detected    Narrative    The Xpert Xpress Strep A test, performed on the [employer], is a rapid, qualitative in vitro diagnostic test for the detection of Streptococcus pyogenes (Group A ß-hemolytic Streptococcus, Strep A) in throat swab specimens from patients with signs and symptoms of  pharyngitis. The Xpert Xpress Strep A test can be used as an aid in the diagnosis of Group A Streptococcal pharyngitis. The assay is not intended to monitor treatment for Group A Streptococcus infections. The Xpert Xpress Strep A test utilizes an automated real-time polymerase chain reaction (PCR) to detect Streptococcus pyogenes DNA.

## 2023-03-22 ENCOUNTER — OFFICE VISIT (OUTPATIENT)
Dept: OBGYN | Facility: OTHER | Age: 33
End: 2023-03-22
Payer: COMMERCIAL

## 2023-03-22 VITALS
SYSTOLIC BLOOD PRESSURE: 112 MMHG | DIASTOLIC BLOOD PRESSURE: 62 MMHG | HEART RATE: 76 BPM | BODY MASS INDEX: 24.05 KG/M2 | WEIGHT: 149 LBS

## 2023-03-22 DIAGNOSIS — N92.0 MENORRHAGIA WITH REGULAR CYCLE: Primary | ICD-10-CM

## 2023-03-22 PROCEDURE — 99214 OFFICE O/P EST MOD 30 MIN: CPT | Performed by: STUDENT IN AN ORGANIZED HEALTH CARE EDUCATION/TRAINING PROGRAM

## 2023-03-22 RX ORDER — ACETAMINOPHEN AND CODEINE PHOSPHATE 120; 12 MG/5ML; MG/5ML
0.35 SOLUTION ORAL DAILY
Qty: 84 TABLET | Refills: 4 | Status: SHIPPED | OUTPATIENT
Start: 2023-03-22

## 2023-03-22 NOTE — NURSING NOTE
Pt presents to clinic today for consult on painful menses.      Medication Reconciliation: complete  Cori Thayer LPN

## 2023-03-22 NOTE — PROGRESS NOTES
Follow-Up Visit    S: Ms. Rowena Mcqueen is a 32 year old  here for discussion of a form of hormone control to help with heavy, painful periods. The worst of the pain is during the first 3 days of her bleeding. The periods are also really heavy during the first 3 days as well. They feel like early labor pains and sometimes limit her to bed or needing to lay down for comfort.    She has an upcoming family wedding and event and is nervous her period will align with that timeframe and limit her ability to enjoy this and be present with her family members.     We discussed all of the options and she is interested in using the minipill as she has had good luck with this in the past with symptoms and tolerance.     In the future she may desire Nexplanon or an IUD, but at this time does not want the unanticipated spotting leading into the spring/summer.     O:  /62   Pulse 76   Wt 67.6 kg (149 lb)   LMP 2023   Breastfeeding No   BMI 24.05 kg/m    Gen: Well-appearing, NAD  Pulm: nonlabored    A/P:  Ms. Rowena Mcqueen is a 32 year old  here for hormonal control of heavy menses.   Would like to start POPs. She will take them with multiple months in a row to limit menses.  -- If POPs are not helping, will schedule Pelvic US and can try Nexplanon vs. IUD if desired.   -- Previously had salpingectomy for contraception.    Total amount of time spent during today's encounter including chart prep, face to face, documentation was 35 minutes  Deepa Rene MD on 3/22/2023 at 4:39 PM

## 2023-04-10 ENCOUNTER — APPOINTMENT (OUTPATIENT)
Dept: ULTRASOUND IMAGING | Facility: OTHER | Age: 33
End: 2023-04-10
Attending: STUDENT IN AN ORGANIZED HEALTH CARE EDUCATION/TRAINING PROGRAM
Payer: COMMERCIAL

## 2023-04-10 ENCOUNTER — HOSPITAL ENCOUNTER (EMERGENCY)
Facility: OTHER | Age: 33
Discharge: HOME | End: 2023-04-10
Attending: STUDENT IN AN ORGANIZED HEALTH CARE EDUCATION/TRAINING PROGRAM | Admitting: STUDENT IN AN ORGANIZED HEALTH CARE EDUCATION/TRAINING PROGRAM
Payer: COMMERCIAL

## 2023-04-10 VITALS
BODY MASS INDEX: 23.63 KG/M2 | OXYGEN SATURATION: 97 % | WEIGHT: 147 LBS | RESPIRATION RATE: 16 BRPM | DIASTOLIC BLOOD PRESSURE: 85 MMHG | SYSTOLIC BLOOD PRESSURE: 137 MMHG | TEMPERATURE: 96.8 F | HEART RATE: 87 BPM | HEIGHT: 66 IN

## 2023-04-10 DIAGNOSIS — R10.31 RLQ ABDOMINAL PAIN: ICD-10-CM

## 2023-04-10 LAB
ALBUMIN SERPL BCG-MCNC: 4.6 G/DL (ref 3.5–5.2)
ALBUMIN UR-MCNC: NEGATIVE MG/DL
ALP SERPL-CCNC: 67 U/L (ref 35–104)
ALT SERPL W P-5'-P-CCNC: 15 U/L (ref 10–35)
ANION GAP SERPL CALCULATED.3IONS-SCNC: 7 MMOL/L (ref 7–15)
APPEARANCE UR: CLEAR
AST SERPL W P-5'-P-CCNC: 17 U/L (ref 10–35)
BASOPHILS # BLD AUTO: 0 10E3/UL (ref 0–0.2)
BASOPHILS NFR BLD AUTO: 1 %
BILIRUB SERPL-MCNC: 0.2 MG/DL
BILIRUB UR QL STRIP: NEGATIVE
BUN SERPL-MCNC: 11.4 MG/DL (ref 6–20)
CALCIUM SERPL-MCNC: 9 MG/DL (ref 8.6–10)
CHLORIDE SERPL-SCNC: 102 MMOL/L (ref 98–107)
COLOR UR AUTO: ABNORMAL
CREAT SERPL-MCNC: 0.8 MG/DL (ref 0.51–0.95)
DEPRECATED HCO3 PLAS-SCNC: 27 MMOL/L (ref 22–29)
EOSINOPHIL # BLD AUTO: 0.1 10E3/UL (ref 0–0.7)
EOSINOPHIL NFR BLD AUTO: 2 %
ERYTHROCYTE [DISTWIDTH] IN BLOOD BY AUTOMATED COUNT: 11.8 % (ref 10–15)
GFR SERPL CREATININE-BSD FRML MDRD: >90 ML/MIN/1.73M2
GLUCOSE SERPL-MCNC: 84 MG/DL (ref 70–99)
GLUCOSE UR STRIP-MCNC: NEGATIVE MG/DL
HCG UR QL: NEGATIVE
HCT VFR BLD AUTO: 41.7 % (ref 35–47)
HGB BLD-MCNC: 14 G/DL (ref 11.7–15.7)
HGB UR QL STRIP: ABNORMAL
HOLD SPECIMEN: NORMAL
IMM GRANULOCYTES # BLD: 0 10E3/UL
IMM GRANULOCYTES NFR BLD: 0 %
KETONES UR STRIP-MCNC: NEGATIVE MG/DL
LEUKOCYTE ESTERASE UR QL STRIP: NEGATIVE
LYMPHOCYTES # BLD AUTO: 1.5 10E3/UL (ref 0.8–5.3)
LYMPHOCYTES NFR BLD AUTO: 31 %
MCH RBC QN AUTO: 29.8 PG (ref 26.5–33)
MCHC RBC AUTO-ENTMCNC: 33.6 G/DL (ref 31.5–36.5)
MCV RBC AUTO: 89 FL (ref 78–100)
MONOCYTES # BLD AUTO: 0.5 10E3/UL (ref 0–1.3)
MONOCYTES NFR BLD AUTO: 11 %
MUCOUS THREADS #/AREA URNS LPF: PRESENT /LPF
NEUTROPHILS # BLD AUTO: 2.7 10E3/UL (ref 1.6–8.3)
NEUTROPHILS NFR BLD AUTO: 55 %
NITRATE UR QL: NEGATIVE
NRBC # BLD AUTO: 0 10E3/UL
NRBC BLD AUTO-RTO: 0 /100
PH UR STRIP: 6.5 [PH] (ref 5–9)
PLATELET # BLD AUTO: 288 10E3/UL (ref 150–450)
POTASSIUM SERPL-SCNC: 3.5 MMOL/L (ref 3.4–5.3)
PROT SERPL-MCNC: 7.7 G/DL (ref 6.4–8.3)
RBC # BLD AUTO: 4.7 10E6/UL (ref 3.8–5.2)
RBC URINE: >182 /HPF
SODIUM SERPL-SCNC: 136 MMOL/L (ref 136–145)
SP GR UR STRIP: 1.02 (ref 1–1.03)
SQUAMOUS EPITHELIAL: 2 /HPF
UROBILINOGEN UR STRIP-MCNC: NORMAL MG/DL
WBC # BLD AUTO: 4.9 10E3/UL (ref 4–11)
WBC URINE: 2 /HPF

## 2023-04-10 PROCEDURE — 80053 COMPREHEN METABOLIC PANEL: CPT | Performed by: STUDENT IN AN ORGANIZED HEALTH CARE EDUCATION/TRAINING PROGRAM

## 2023-04-10 PROCEDURE — 96372 THER/PROPH/DIAG INJ SC/IM: CPT | Performed by: STUDENT IN AN ORGANIZED HEALTH CARE EDUCATION/TRAINING PROGRAM

## 2023-04-10 PROCEDURE — 36415 COLL VENOUS BLD VENIPUNCTURE: CPT | Performed by: STUDENT IN AN ORGANIZED HEALTH CARE EDUCATION/TRAINING PROGRAM

## 2023-04-10 PROCEDURE — 99283 EMERGENCY DEPT VISIT LOW MDM: CPT | Performed by: STUDENT IN AN ORGANIZED HEALTH CARE EDUCATION/TRAINING PROGRAM

## 2023-04-10 PROCEDURE — 85014 HEMATOCRIT: CPT | Performed by: STUDENT IN AN ORGANIZED HEALTH CARE EDUCATION/TRAINING PROGRAM

## 2023-04-10 PROCEDURE — 99285 EMERGENCY DEPT VISIT HI MDM: CPT | Mod: 25 | Performed by: STUDENT IN AN ORGANIZED HEALTH CARE EDUCATION/TRAINING PROGRAM

## 2023-04-10 PROCEDURE — 76856 US EXAM PELVIC COMPLETE: CPT | Mod: TC

## 2023-04-10 PROCEDURE — 81001 URINALYSIS AUTO W/SCOPE: CPT | Performed by: EMERGENCY MEDICINE

## 2023-04-10 PROCEDURE — 250N000011 HC RX IP 250 OP 636: Performed by: STUDENT IN AN ORGANIZED HEALTH CARE EDUCATION/TRAINING PROGRAM

## 2023-04-10 PROCEDURE — 81025 URINE PREGNANCY TEST: CPT | Performed by: EMERGENCY MEDICINE

## 2023-04-10 RX ORDER — KETOROLAC TROMETHAMINE 30 MG/ML
30 INJECTION, SOLUTION INTRAMUSCULAR; INTRAVENOUS ONCE
Status: COMPLETED | OUTPATIENT
Start: 2023-04-10 | End: 2023-04-10

## 2023-04-10 RX ADMIN — KETOROLAC TROMETHAMINE 30 MG: 30 INJECTION, SOLUTION INTRAMUSCULAR at 20:23

## 2023-04-10 ASSESSMENT — ACTIVITIES OF DAILY LIVING (ADL)
ADLS_ACUITY_SCORE: 35
ADLS_ACUITY_SCORE: 35

## 2023-04-10 NOTE — ED TRIAGE NOTES
Pt comes into the ER today reporting right sided abd/flank pain that comes and goes in severity, up to 10/10. It is like she is having some labor pains. She reports that the pain comes on in surges that last a couple of hours, almost to the point where she is going to pass out. She is on day 5 of her period; normal flow. She normally has period cramps, this is worse. She is also on day 9 of her birth control pill, which is new, to control her cycle. She has had her tubes out.   No urinary or bowel symptoms. No fever.      Triage Assessment     Row Name 04/10/23 [id number]       Triage Assessment (Adult)    Airway WDL WDL       Respiratory WDL    Respiratory WDL WDL       Skin Circulation/Temperature WDL    Skin Circulation/Temperature WDL WDL       Cardiac WDL    Cardiac WDL WDL       Peripheral/Neurovascular WDL    Peripheral Neurovascular WDL WDL       Cognitive/Neuro/Behavioral WDL    Cognitive/Neuro/Behavioral WDL WDL       [place] Coma Scale    Best Eye Response 4-->(E4) spontaneous    Best Motor Response 6-->(M6) obeys commands    Best Verbal Response 5-->(V5) oriented    [place] Coma Scale Score 15

## 2023-04-11 NOTE — ED PROVIDER NOTES
History     Chief Complaint   Patient presents with     Abdominal Pain     Right sided abd pain       HPI     Rowena Mcqueen is a 32 year old year old female presenting with right lower quadrant abdominal pain.  Onset 5 days ago preceded by her period which started 6 days ago preceded by a day of starting progesterone only OCP to help control painful bleeding menstrual cycles.  She did pass a quarter sized clot today otherwise no other significant abnormal vaginal bleeding.  No abnormal vaginal discharge, dysuria, fever, chills, vomiting.  She has had some nausea associate with the pain.  Pain has been waxing and waning in intensity reaching 10/10.  Currently rates pain 4/10 which improved after passing the clot.  Home treatments include ibuprofen and Tylenol with last dose of ibuprofen approximately 8 hours ago.  She has had cysts in her right ovary in the past with right lower quadrant pain but never this painful.  This feels different than cyst pain.  Abdominal surgical history includes ovarian cystectomy and bilateral salpingectomy.  Denies constipation, diarrhea.     Allergies   Allergen Reactions     Food Itching and Swelling     Apples- Gums swell and throat itches  Hazelnuts - Itchy throat     Seasonal Allergies      Penicillins Hives and Rash     Other reaction(s): Throat Swelling/Closing       Patient Active Problem List    Diagnosis Date Noted     Allergic rhinitis 05/21/2021     Priority: Medium     Adnexal cyst 04/06/2021     Priority: Medium     Added automatically from request for surgery [id number]       Cyst of right ovary 02/09/2021     Priority: Medium     Added automatically from request for surgery [id number]         Past Medical History:   Diagnosis Date     Contact dermatitis and eczema 05/21/2021     History of other genital system and obstetric disorders        Past Surgical History:   Procedure Laterality Date     LAPAROSCOPIC CYSTECTOMY OVARIAN (BENIGN) N/A 6/15/2022    Procedure:  "EXCISION, CYST, BENIGN, Left  OVARY, LAPAROSCOPIC, bilateral salpingectomy;  Surgeon: Deepa Hewitt MD;  Location: [facility] OR     WISDOM TOOTH EXTRACTION         Family History   Problem Relation Age of Onset     Family History Negative Mother         Good Health     Other - See Comments Father         Psychiatric illness,ADHD and depression     Family History Negative Brother         Good Health     Hypertension Maternal Grandmother         Hypertension     Other - See Comments Maternal Grandfather         Peripheral vascular disease/ of myelodysplasia age 74 and CAD/ age 74 of coronary artery disease     Arthritis Paternal Grandmother         Arthritis     Heart Disease Paternal Grandfather         Heart Disease, of MI age 58     Family History Negative Other         Good Health     Family History Negative Sister         Good Health       Social History     Tobacco Use     Smoking status: Never     Smokeless tobacco: Never   Vaping Use     Vaping status: Never Used   Substance Use Topics     Alcohol use: Yes     Comment: Alcoholic Drinks/day: occasional     Drug use: Never       Medications:    Cholecalciferol (VITAMIN D3) 50 MCG (2000 UT) CAPS  Cyanocobalamin (B-12) 1000 MCG TBCR  norethindrone (MICRONOR) 0.35 MG tablet  ondansetron (ZOFRAN ODT) 4 MG ODT tab  oxyCODONE (ROXICODONE) 5 MG tablet  Prenatal Vit-Fe Fumarate-FA (PRENATAL MULTIVITAMIN W/IRON) 27-0.8 MG tablet  triamcinolone (KENALOG) 0.1 % external ointment        Review of Systems: See HPI for pertinent negatives and positives. All other systems reviewed and found to be negative.    Physical Exam   /85   Pulse 87   Temp 96.8  F (36  C) (Tympanic)   Resp 16   Ht 1.676 m (5' 6\")   Wt 66.7 kg (147 lb)   LMP 2023   SpO2 97%   BMI 23.73 kg/m       General: awake, mildly uncomfortable  HEENT: atraumatic  Respiratory: normal effort, clear to auscultation bilaterally  Cardiovascular: regular rate and rhythm, no murmurs  Abdomen: " BS+, soft, nondistended, tender in RLQ, no guarding or rebound  Extremities: no deformities, edema, or tenderness  : no CVA tenderness  Skin: warm, dry, no rashes  Neuro: alert, no focal deficits    ED Course      ED Course as of 04/11/23 0115   Mon Apr 10, 2023   2000 UA with blood consistent with current menstrual cycle.       Results for orders placed or performed during the hospital encounter of 04/10/23 (from the past 24 hour(s))   HCG qualitative urine   Result Value Ref Range    hCG Urine Qualitative Negative Negative   UA with Microscopic reflex to Culture    Specimen: Urine, Midstream   Result Value Ref Range    Color Urine Light Yellow Colorless, Straw, Light Yellow, Yellow    Appearance Urine Clear Clear    Glucose Urine Negative Negative mg/dL    Bilirubin Urine Negative Negative    Ketones Urine Negative Negative mg/dL    Specific Gravity Urine 1.018 1.000 - 1.030    Blood Urine Large (A) Negative    pH Urine 6.5 5.0 - 9.0    Protein Albumin Urine Negative Negative mg/dL    Urobilinogen Urine Normal Normal, 2.0 mg/dL    Nitrite Urine Negative Negative    Leukocyte Esterase Urine Negative Negative    Mucus Urine Present (A) None Seen /LPF    RBC Urine >182 (H) <=2 /HPF    WBC Urine 2 <=5 /HPF    Squamous Epithelials Urine 2 (H) <=1 /HPF    Narrative    Urine Culture not indicated   CBC with platelets differential    Narrative    The following orders were created for panel order CBC with platelets differential.  Procedure                               Abnormality         Status                     ---------                               -----------         ------                     CBC with platelets and d...[[id number]]                      Final result                 Please view results for these tests on the individual orders.   Comprehensive metabolic panel   Result Value Ref Range    Sodium 136 136 - 145 mmol/L    Potassium 3.5 3.4 - 5.3 mmol/L    Chloride 102 98 - 107 mmol/L    Carbon Dioxide  (CO2) 27 22 - 29 mmol/L    Anion Gap 7 7 - 15 mmol/L    Urea Nitrogen 11.4 6.0 - 20.0 mg/dL    Creatinine 0.80 0.51 - 0.95 mg/dL    Calcium 9.0 8.6 - 10.0 mg/dL    Glucose 84 70 - 99 mg/dL    Alkaline Phosphatase 67 35 - 104 U/L    AST 17 10 - 35 U/L    ALT 15 10 - 35 U/L    Protein Total 7.7 6.4 - 8.3 g/dL    Albumin 4.6 3.5 - 5.2 g/dL    Bilirubin Total 0.2 <=1.2 mg/dL    GFR Estimate >90 >60 mL/min/1.73m2   CBC with platelets and differential   Result Value Ref Range    WBC Count 4.9 4.0 - 11.0 10e3/uL    RBC Count 4.70 3.80 - 5.20 10e6/uL    Hemoglobin 14.0 11.7 - 15.7 g/dL    Hematocrit 41.7 35.0 - 47.0 %    MCV 89 78 - 100 fL    MCH 29.8 26.5 - 33.0 pg    MCHC 33.6 31.5 - 36.5 g/dL    RDW 11.8 10.0 - 15.0 %    Platelet Count 288 150 - 450 10e3/uL    % Neutrophils 55 %    % Lymphocytes 31 %    % Monocytes 11 %    % Eosinophils 2 %    % Basophils 1 %    % Immature Granulocytes 0 %    NRBCs per 100 WBC 0 <1 /100    Absolute Neutrophils 2.7 1.6 - 8.3 10e3/uL    Absolute Lymphocytes 1.5 0.8 - 5.3 10e3/uL    Absolute Monocytes 0.5 0.0 - 1.3 10e3/uL    Absolute Eosinophils 0.1 0.0 - 0.7 10e3/uL    Absolute Basophils 0.0 0.0 - 0.2 10e3/uL    Absolute Immature Granulocytes 0.0 <=0.4 10e3/uL    Absolute NRBCs 0.0 10e3/uL   Extra Tube    Narrative    The following orders were created for panel order Extra Tube.  Procedure                               Abnormality         Status                     ---------                               -----------         ------                     Extra Red Top Tube[[id number]]                               Final result                 Please view results for these tests on the individual orders.   Extra Red Top Tube   Result Value Ref Range    Hold Specimen JIC    Extra Tube    Narrative    The following orders were created for panel order Extra Tube.  Procedure                               Abnormality         Status                     ---------                                -----------         ------                     Extra Blue Top Tube[[id number]]                              Final result               Extra Green Top (Lithium...[[id number]]                      Final result                 Please view results for these tests on the individual orders.   Extra Blue Top Tube   Result Value Ref Range    Hold Specimen JIC    Extra Green Top (Lithium Heparin) ON ICE   Result Value Ref Range    Hold Specimen JIC    US Pelvis Cmplt w Transvag & Doppler LmtPel Duplex Limited    Narrative    PROCEDURE INFORMATION:   Exam: US Abdomen; Limited   Exam date and time: 4/10/2023 9:12 PM   Age: 32 years old   Clinical indication: Pelvic pain; Prior surgery; Surgery date: 6+ months;   Surgery type: Tubal removal; Additional info: Rlq pain waxing/waning, torsion   vs appendicitis vs other     TECHNIQUE:   Imaging protocol: Real time ultrasound of the abdomen with image documentation.   Limited exam focused on the region of clinical interest.     COMPARISON:   No relevant prior studies available.     FINDINGS:   Bowel: Visualized bowel loops are unremarkable.   Appendix: Appendix is NOT reliably visualized. No free fluid in the RIGHT lower   quadrant.       Impression    IMPRESSION:   1.   Appendix is NOT reliably visualized and no obvious inflammatory changes in   the RIGHT lower quadrant.   2.   Followup with CT is recommended if continued high clinical suspicion for   acute appendicitis.       =========================  PROCEDURE INFORMATION:   Exam: US Pelvis Complete, Transabdominal and US Pelvis, Transvaginal and US   Duplex Artery and Vein, Ovaries, Complete   Exam date and time: 4/10/2023 9:12 PM   Age: 32 years old   Clinical indication: Pelvic pain; Prior surgery; Surgery date: 6+ months;   Surgery type: Tubal removal; Additional info: Rlq pain waxing/waning, torsion   vs appendicitis vs other     TECHNIQUE:   Imaging protocol: Real-time complete transabdominal and transvaginal pelvic    ultrasound with image documentation. Transvaginal imaging was used for better   evaluation of the endometrium, adnexa, and/or cervix. Real-time duplex   ultrasound scan of the arterial and venous flow of the ovaries with B-mode,   color Doppler flow and spectral waveform analysis. Duplex exam was performed to   evaluate for torsion and other vascular conditions.     COMPARISON:  05/19/2022    FINDINGS:   Uterus: Measures approximately 8.1 x 4.8 x 5.7 cm.   Right ovary/adnexa: Measures approximately 7.3 mL. Multiple small anechoic   follicles with dominant simple cyst/follicle measuring 10 mm.   Left ovary/adnexa: Measures approximately 2.5 mL. Few small anechoic follicles.   Intraperitoneal space: No discernible adnexal mass or abnormality. Small amount   free fluid within the pelvis.   Urinary bladder: Partially distended urinary bladder.     Endometrial stripe thickness: Endometrial thickness is approximately 9 mm.     Other findings: Doppler examination of the ovaries with pulsed wave and color   images was performed which demonstrate arterial/venous waveforms within normal   limits.     IMPRESSION:   1.   Normal pelvic sonogram.   2.   Normal ovaries demonstrating blood flow on Doppler.   3.   Small amount of physiologic free fluid in the pelvis likely due to a   ruptured ovarian cyst/follicle.     THIS DOCUMENT HAS BEEN ELECTRONICALLY SIGNED BY EUNICE VAZQUEZ MD       Medications   ketorolac (TORADOL) injection 30 mg (30 mg Intramuscular $Given 4/10/23 2023)       Assessments & Plan (with Medical Decision Making)     I have reviewed the nursing notes.    32 year old female evaluated for right lower abdominal pain over past approximate 5 days.  Afebrile.  Right lower quadrant pain without guarding or rebound on exam.  No leukocytosis.  Urine not infected but does have large amount of blood consistent with her current menstrual cycle.  Without fever or leukocytosis, there is lower suspicion for appendicitis,  particularly with waxing and waning course and no overall progressive symptoms. Therefore ultrasound was pursued demonstrating no torsion but some free fluid concerning for recent cyst rupture.  No signs of appendicitis such as inflammatory changes were seen on ultrasound.  Patient significantly improved after Toradol.  Offered short course of narcotic pain medication which patient declined.  Recommend over-the-counter Tylenol and NSAIDs as needed.  Patient will follow-up with OB/GYN provider if significant symptoms recur. Discharged home with attached instructions on diagnosis including ED return precautions.     I have reviewed the findings, diagnosis, plan and need for follow up with the patient.    Patient instructions:   Your right lower quadrant pain is likely due to a recent ruptured ovarian cyst.  Reasons to return to the ER include return of severe pain, pain with a fever 100.4 Fahrenheit or higher, significant nausea/vomiting, or abnormal vaginal discharge or significant worsening of vaginal bleeding.    Discharge Medication List as of 4/10/2023 11:07 PM          Final diagnoses:   RLQ abdominal pain       4/10/2023   [facility]       Tyshawn Null MD  04/11/23 0116

## 2023-04-11 NOTE — DISCHARGE INSTRUCTIONS
Your right lower quadrant pain is likely due to a recent ruptured ovarian cyst.  Reasons to return to the ER include return of severe pain, pain with a fever 100.4 Fahrenheit or higher, significant nausea/vomiting, or abnormal vaginal discharge or significant worsening of vaginal bleeding.

## 2023-05-06 ENCOUNTER — HEALTH MAINTENANCE LETTER (OUTPATIENT)
Age: 33
End: 2023-05-06

## 2023-10-31 ENCOUNTER — E-VISIT (OUTPATIENT)
Dept: FAMILY MEDICINE | Facility: OTHER | Age: 33
End: 2023-10-31
Payer: COMMERCIAL

## 2023-10-31 DIAGNOSIS — J01.90 ACUTE BACTERIAL SINUSITIS: Primary | ICD-10-CM

## 2023-10-31 DIAGNOSIS — B96.89 ACUTE BACTERIAL SINUSITIS: Primary | ICD-10-CM

## 2023-10-31 PROCEDURE — 99421 OL DIG E/M SVC 5-10 MIN: CPT | Performed by: PHYSICIAN ASSISTANT

## 2023-11-01 RX ORDER — DOXYCYCLINE HYCLATE 100 MG
100 TABLET ORAL 2 TIMES DAILY
Qty: 14 TABLET | Refills: 0 | Status: SHIPPED | OUTPATIENT
Start: 2023-11-01 | End: 2023-11-08

## 2023-11-01 NOTE — PATIENT INSTRUCTIONS
Acute Sinusitis: Care Instructions  Overview     Acute sinusitis is an inflammation of the mucous membranes inside the nose and sinuses. Sinuses are the hollow spaces in your skull around the eyes and nose. Acute sinusitis often follows a cold. Acute sinusitis causes thick, discolored mucus that drains from the nose or down the back of the throat. It also can cause pain and pressure in your head and face along with a stuffy or blocked nose.  In most cases, sinusitis gets better on its own in 1 to 2 weeks. But some mild symptoms may last for several weeks. Sometimes antibiotics are needed if there is a bacterial infection.  Follow-up care is a key part of your treatment and safety. Be sure to make and go to all appointments, and call your doctor if you are having problems. It's also a good idea to know your test results and keep a list of the medicines you take.  How can you care for yourself at home?  Use saline (saltwater) nasal washes. This can help keep your nasal passages open and wash out mucus and allergens.  You can buy saline nose washes at a grocery store or drugstore. Follow the instructions on the package.  You can make your own at home. Add 1 teaspoon of non-iodized salt and 1 teaspoon of baking soda to 2 cups of distilled or boiled and cooled water. Fill a squeeze bottle or a nasal cleansing pot (such as a neti pot) with the nasal wash. Then put the tip into your nostril, and lean over the sink. With your mouth open, gently squirt the liquid. Repeat on the other side.  Try a decongestant nasal spray like oxymetazoline (Afrin). Do not use it for more than 3 days in a row. Using it for more than 3 days can make your congestion worse.  If needed, take an over-the-counter pain medicine, such as acetaminophen (Tylenol), ibuprofen (Advil, Motrin), or naproxen (Aleve). Read and follow all instructions on the label.  If the doctor prescribed antibiotics, take them as directed. Do not stop taking them just  "because you feel better. You need to take the full course of antibiotics.  Be careful when taking over-the-counter cold or flu medicines and Tylenol at the same time. Many of these medicines have acetaminophen, which is Tylenol. Read the labels to make sure that you are not taking more than the recommended dose. Too much acetaminophen (Tylenol) can be harmful.  Try a steroid nasal spray. It may help with your symptoms.  Breathe warm, moist air. You can use a steamy shower, a hot bath, or a sink filled with hot water. Avoid cold, dry air. Using a humidifier in your home may help. Follow the directions for cleaning the machine.  When should you call for help?   Call your doctor now or seek immediate medical care if:    You have new or worse swelling, redness, or pain in your face or around one or both of your eyes.     You have double vision or a change in your vision.     You have a high fever.     You have a severe headache and a stiff neck.     You have mental changes, such as feeling confused or much less alert.   Watch closely for changes in your health, and be sure to contact your doctor if:    You are not getting better as expected.   Where can you learn more?  Go to https://www."[employer].net/patiented  Enter I933 in the search box to learn more about \"Acute Sinusitis: Care Instructions.\"  Current as of: March 1, 2023               Content Version: 13.7    6025-0007 [employer].   Care instructions adapted under license by your healthcare professional. If you have questions about a medical condition or this instruction, always ask your healthcare professional. [employer] disclaims any warranty or liability for your use of this information.      Dear Rowena Mcqueen    After reviewing your responses, I've been able to diagnose you with a sinus infection      Based on your responses and diagnosis, I have prescribed doxycycline antibiotic to treat your symptoms. I have sent this to " your pharmacy.?     It is also important to stay well hydrated, get lots of rest and take over-the-counter decongestants,?tylenol?or ibuprofen if you?are able to?take those medications per your primary care provider to help relieve discomfort.?     It is important that you take?all of?your prescribed medication even if your symptoms are improving after a few doses.? Taking?all of?your medicine helps prevent the symptoms from returning.?     If your symptoms worsen, you develop severe headache, vomiting, high fever (>102), or are not improving in 7 days, please contact your primary care provider for an appointment or visit any of our convenient Walk-in Care or Urgent Care Centers to be seen which can be found on our website?here.?     Thanks again for choosing?us?as your health care partner,?   ?  Sandy Reza PA-C?

## 2024-01-29 NOTE — PROGRESS NOTES
31 year old  at 35 3/7 weeks gestation to [facility] room 401 from the clinic. Patient reports that she has not been feeling well for 2 days. She was evaluated by Dr. Storey in the clinic and sent to be evaluated in OB. EFM/EUM applied. Will continue to monitor.    Information regarding Hypoglossal Nerve Stimulation Therapy:    Hypoglossal Nerve Stimulation Therapy (HGNS) is a surgical treatment for sleep apnea when a patient fails standard positive pressure (CPAP) therapy. This is an effective surgical therapy for sleep apnea with long term effectiveness in many patients.    What is HGNS and how does it work?  The hypoglossal nerve is the nerve that controls nearly all of the tongue movements.   During sleep, the tongue will often fall into the back of the throat. This causes obstruction and apneas by narrowing the throat.  By targeting the tongue muscles, we can often control the prevention of tongue collapse during sleep thereby preventing collapse and apnea.  This procedure involves inserting an implant over the muscles of the chest wall (similar to a pacemaker.) This implant is connected to the nerve that moves the tongue forward. During sleep, the implant will detect breathing effort, and during inspiration, it will cause a gentle pulse of the tongue forward to prevent collapse.   The machine can be turned on and off, and it can be dialed up or down depending on strength needed to move the tongue forward.    Will I feel it? Will it affect my sleep at night?  Once the implant is in place, it is activated after 1 month. After this, you will begin increasing the settings to find the most comfortable setting for you. We work hard with you to get the right settings.   A majority of patients are not disturbed at night with the device, and satisfaction is extremely high (95%.) This is especially true when compared to wearing an external device (CPAP.)   A post-titration sleep study is typically obtained about 3 months after surgery to assess response.     Am I a Candidate?  Candidacy for HGNS is changing over time; however, current general candidacy requirements are:  Moderate or Severe Sleep Apnea (AHI 15-65) as measured by a recent sleep study  Body Mass Index (BMI) < 35    Failure to tolerate CPAP therapy  Pre-operative endoscopy exam confirming candidacy (performed during a quick outpatient procedure)  No contraindications to implant  Approval by insurance company  **The current device is currently under conditional acceptance for use with MRI of the thoracic, shoulder, or abdomen area.    How is the surgery performed:  The surgery is usually performed in an outpatient setting, under general anesthesia.  The procedure generally takes a few hours.  The procedure involved two incisions: one in the neck, just below the jaw line. The other is in the chest between the 2nd and third rib. The surgical scars generally heal very well and are minimally noticeable.   Please let your doctor know if you have had surgery or significant trauma to the chest or neck.     What are the risks?  General surgical risks, similar to any procedure include  Bleeding or hematoma  Numbness over incision site  Incisional scar  Pain over incision site  Infection of implant requiring revision or replacement (<1%)  Intolerance to the therapy resulting in non-usage    Specific risks to this surgery include:  Temporary tongue weakness or tingling, rarely permanent (< 1%)  Mechanical failure of the implant  Temporary or permanent weakness to the muscles of the lip (< 1%)  Stimulation related discomfort or tongue abrasions (8%)  Pneumothorax (dropped lung)    After care:  Instructions will be given following the surgery  Generally, light activity for 2 weeks is recommended.  Blood thinners are held prior to surgery at the discretion of the surgeon and can usually be resumed within 48 hours.     Post-operative timeline:    WEEK 1:  About 7 days after your procedure, you will return to the office for a follow up visit. At this time any surtures that we placed will be removed and your incisions will be checked by the pysician. Please note that device will remain inactive for ONE MONTH. This is refered to as your  healing phase. We want to give your body time to heal before we turn the device on. During this time it would be benefical for you to watch the vidoes on the inspire jorge. Simply download the jorge if you have not done so, then click on the RESOURCES tab. From there, scrol to the INSPIRE CARE section. There are vidoes here that walk you though this phase and will prepare you for the activation visit. Focus on the vidoes entitled: Rest and Heal, Learning your sleep remote, and Tuning Inspire. This will help you feel educated and empowered for your visit.     WEEK 4:  At the one month visit you will come to the office for the device activation. Please wear a shirt or blouse that can allow the physician to check all the incisions and access the implant. Your physician will use a  to check the device for proper function and turn it on for you to begin using Inspire therapy. The process of activation is NOT painful. At this visit you will be given your remote control and instructions on how to use. You will return home to begin using your therapy with the goal of using it ALL NIGHT, EVERY NIGHT. If you have any issues with discomfort, please call the office so we can help you reach this goal.    WEEK 6-8:  Someone from our office will be doing a check in call to see how you are progressing. If you are having any issues, we can bring you in to the office and make some appropratie adjustments as needed. Otherwise, you will continue to keep stepping up your therapy and using it all night.     WEEK 12:  Roughly 3 months after your device is turned on, you will return to the sleep lab. This will help us determine if your therapy is at the appropriate level for you. A couple of weeks after your sleep study, you will return to the office to review your results and make any changes that your provider feels necessary.       FAQ:  I don't feel the stimulation?   Answer: During the tuning phase, you will get used to  stimulation.  It's very common to not feel stimulation.  Try stepping up your level.    The stimulation does not synchronize with my breathing?    Answer: This is normal.  Stimulation timing is designed to work best while you are asleep.  Your breathing pattern is much different while you are awake.  When you go to sleep, the sensor will work normally.     Can I receive an MRI?    Answer: If you have the model 3028 generator it is MRI Conditional, make sure your doctor goes to the Inspire website for more information on how to perform a safe MRI for you.     Can I go to the airport?    Answer: Yes.  You may go through metal detectors and scanners.  Make sure to show the TSA official your medical device registration card and tell them you have an implantable device in your body.  They may require extra screening, so make sure to plan for extra time your first time going to the airport.     Can I go scuba diving and mountain climbing?    Answer: You can go to as high in elevation as you want.  However, you can only go 30 meters below sea level or 4 atmospheres of pressure.     Can I continue welding?    Answer: Inspire does not recommend electrical welding.  Consult your patient manual for more details.    Can I go to the dentist?    Answer: Going to the dentist is perfectly fine. Let your dentist know you have an implantable device and the dentist will take any precautions needed.     Can I receive X-Rays or CT Scans?    Answer: Yes.  X-Ray and CT Scans are safe with Inspire.  If you are having a mammogram though, make sure to let the technician know where your device is implanted so the technician can make the mammogram as comfortable as possible.     Why is stimulation on when I'm awake?    Answer: This is normal.  Inspire works by stimulating during every breath.  Inspire does not know when you are asleep or awake, so once you turn on Inspire, it will stimulate with each breath. Remember, you are in complete  control of your Inspire and can turn Inspire ON or OFF using your Inspire Sleep Remote.     Why is stimulation off when I wake up?    Answer: You may have slept longer than the Therapy Duration Setting, which by default is 8 hours.  Inspire will turn off automatically after 8 hours.  If you are sleeping longer than 8 hours, ask your physician to change the duration setting to a longer time (for example 9 or 10 hours).  It is also possible that you are getting used to Inspire and just don't feel the stimulation when you wake up.  If you're sleep remote is green when you gently shake it, your Inspire is still on.  If your Inspire is white, then it is no longer stimulating.       More information can be found at:  Www.[email or web address].com

## 2024-07-13 ENCOUNTER — HEALTH MAINTENANCE LETTER (OUTPATIENT)
Age: 34
End: 2024-07-13

## 2024-12-13 NOTE — TELEPHONE ENCOUNTER
Patient called with complaints of being nauseated and wanting to know what she can take. Discussed the safe to take while pregnant over the counter medications and sent this list to her via [employer]. Advised to call back if these do not help her feel better. Patient voiced understanding and had no further questions or concerns at this time.    Noemi Anderson RN on 6/16/2021 at 9:33 AM    
36.7

## 2025-07-19 ENCOUNTER — HEALTH MAINTENANCE LETTER (OUTPATIENT)
Age: 35
End: 2025-07-19

## (undated) DEVICE — PREP CHLORAPREP 26ML TINTED ORANGE  260815

## (undated) DEVICE — SLEEVE COMPRESSION SCD KNEE MED 74022

## (undated) DEVICE — DRAPE LAVH/LAPAROSCOPY W/POUCH 29474

## (undated) DEVICE — PACK LAPAROSCOPY LF SBA15LPFCA

## (undated) DEVICE — ESU GROUND PAD ADULT W/CORD E7507

## (undated) DEVICE — GLOVE ESTEEM POWDER FREE SMT 6.5  2D72PT65

## (undated) DEVICE — SU VICRYL 0 UR-6 27" J603H

## (undated) DEVICE — VERRES NEEDLE 120MM DISPOSABLE 12/BX

## (undated) DEVICE — TUBING INSUFFLATOR W/FILTER OLYMPUS WA95005A

## (undated) DEVICE — GLOVE BIOGEL 7 LATEX

## (undated) DEVICE — TROCAR KII SLEEVE 5MM X 100MM 12/BX

## (undated) DEVICE — SU MONOCRYL 3-0 PS-2 27" Y427H

## (undated) DEVICE — DECANTER VIAL 2006S

## (undated) DEVICE — ESU HOLDER LAP INST DISP PURPLE LONG 330MM H-PRO-330

## (undated) DEVICE — Device

## (undated) DEVICE — KIT PATIENT POSITIONING PIGAZZI LATEX FREE 40580

## (undated) DEVICE — ENDO TROCAR FIRST ENTRY KII FIOS ADV FIX 05X100MM CFF03

## (undated) DEVICE — SOL WATER 1500ML

## (undated) DEVICE — CATH SELF FEMALE 14FR 6" 214

## (undated) DEVICE — TRAY DRY SKIN PREP TRAY PREMIUM DYND70661

## (undated) RX ORDER — BUPIVACAINE HYDROCHLORIDE 2.5 MG/ML
INJECTION, SOLUTION EPIDURAL; INFILTRATION; INTRACAUDAL
Status: DISPENSED
Start: 2022-06-15

## (undated) RX ORDER — PROPOFOL 10 MG/ML
INJECTION, EMULSION INTRAVENOUS
Status: DISPENSED
Start: 2022-06-15

## (undated) RX ORDER — FENTANYL CITRATE-0.9 % NACL/PF 10 MCG/ML
PLASTIC BAG, INJECTION (ML) INTRAVENOUS
Status: DISPENSED
Start: 2022-06-15

## (undated) RX ORDER — FENTANYL CITRATE 50 UG/ML
INJECTION, SOLUTION INTRAMUSCULAR; INTRAVENOUS
Status: DISPENSED
Start: 2022-06-15

## (undated) RX ORDER — GLYCOPYRROLATE 0.2 MG/ML
INJECTION, SOLUTION INTRAMUSCULAR; INTRAVENOUS
Status: DISPENSED
Start: 2022-06-15

## (undated) RX ORDER — LIDOCAINE HYDROCHLORIDE 20 MG/ML
INJECTION, SOLUTION EPIDURAL; INFILTRATION; INTRACAUDAL; PERINEURAL
Status: DISPENSED
Start: 2022-06-15

## (undated) RX ORDER — SEVOFLURANE 250 ML/250ML
LIQUID RESPIRATORY (INHALATION)
Status: DISPENSED
Start: 2022-06-15

## (undated) RX ORDER — KETOROLAC TROMETHAMINE 30 MG/ML
INJECTION, SOLUTION INTRAMUSCULAR; INTRAVENOUS
Status: DISPENSED
Start: 2022-06-15

## (undated) RX ORDER — ONDANSETRON 2 MG/ML
INJECTION INTRAMUSCULAR; INTRAVENOUS
Status: DISPENSED
Start: 2021-12-25

## (undated) RX ORDER — LIDOCAINE HYDROCHLORIDE 10 MG/ML
INJECTION, SOLUTION EPIDURAL; INFILTRATION; INTRACAUDAL; PERINEURAL
Status: DISPENSED
Start: 2022-06-15

## (undated) RX ORDER — SODIUM CHLORIDE, SODIUM LACTATE, POTASSIUM CHLORIDE, CALCIUM CHLORIDE 600; 310; 30; 20 MG/100ML; MG/100ML; MG/100ML; MG/100ML
INJECTION, SOLUTION INTRAVENOUS
Status: DISPENSED
Start: 2022-01-04

## (undated) RX ORDER — DEXAMETHASONE SODIUM PHOSPHATE 4 MG/ML
INJECTION, SOLUTION INTRA-ARTICULAR; INTRALESIONAL; INTRAMUSCULAR; INTRAVENOUS; SOFT TISSUE
Status: DISPENSED
Start: 2022-06-15

## (undated) RX ORDER — ONDANSETRON 2 MG/ML
INJECTION INTRAMUSCULAR; INTRAVENOUS
Status: DISPENSED
Start: 2022-06-15

## (undated) RX ORDER — KETOROLAC TROMETHAMINE 30 MG/ML
INJECTION, SOLUTION INTRAMUSCULAR; INTRAVENOUS
Status: DISPENSED
Start: 2023-04-10